# Patient Record
Sex: FEMALE | Race: WHITE
[De-identification: names, ages, dates, MRNs, and addresses within clinical notes are randomized per-mention and may not be internally consistent; named-entity substitution may affect disease eponyms.]

---

## 2019-01-22 ENCOUNTER — HOSPITAL ENCOUNTER (OUTPATIENT)
Dept: HOSPITAL 53 - M LAB REF | Age: 64
End: 2019-01-22
Attending: PHYSICIAN ASSISTANT
Payer: COMMERCIAL

## 2019-01-22 DIAGNOSIS — M54.5: Primary | ICD-10-CM

## 2019-03-20 ENCOUNTER — HOSPITAL ENCOUNTER (OUTPATIENT)
Dept: HOSPITAL 53 - M OPP | Age: 64
Discharge: HOME | End: 2019-03-20
Attending: INTERNAL MEDICINE
Payer: MEDICARE

## 2019-03-20 VITALS — HEIGHT: 60 IN | WEIGHT: 142 LBS | BODY MASS INDEX: 27.88 KG/M2

## 2019-03-20 VITALS — SYSTOLIC BLOOD PRESSURE: 151 MMHG | DIASTOLIC BLOOD PRESSURE: 91 MMHG

## 2019-03-20 DIAGNOSIS — I63.9: Primary | ICD-10-CM

## 2019-03-20 PROCEDURE — 93312 ECHO TRANSESOPHAGEAL: CPT

## 2019-03-20 PROCEDURE — 93320 DOPPLER ECHO COMPLETE: CPT

## 2019-03-20 PROCEDURE — 93325 DOPPLER ECHO COLOR FLOW MAPG: CPT

## 2019-03-20 NOTE — T-ECHO
DATE OF PROCEDURE:  03/20/2019

 

REFERRING PHYSICIAN:

 

PREPROCEDURE DIAGNOSIS:  Cryptogenic stroke.

 

POSTPROCEDURE DIAGNOSIS: Cryptogenic stroke, patent foramen ovale with

intraatrial septal aneurysm.

 

FINDINGS: Patent foramen ovale associated with intraatrial septal aneurysm.

 

PROCEDURE: Transesophageal echocardiogram with bubble study.

 

SURGEON: Rich Aguilar MD

 

ASSISTANT: None.

 

CONSCIOUS SEDATION: IV conscious sedation: Midazolam 2 mg IV.

 

COMPLICATIONS: None.

 

PROCEDURE DESCRIPTION:

Patient received Cetacaine spray to the back of the pharynx. She received a total

of 2 mg of Midazolam IV for light IV conscious sedation. Patient tolerated the

procedure well without any immediate complications. Rhythm was sinus. Esophageal

intubation was accomplished without difficulty using a Gavin's 3D

transesophageal echocardiogram probe.  A secundum type interatrial septal

aneurysm was present. This was associated with a small patent foramen ovale that

was demonstrated using a bubble injection via the right upper extremity with

Valsalva maneuver release showing a small amount of shunting from the right

atrium to the left atrium via the patent foramen ovale (PFO). The bubble study

was accomplished using 8 mL of injectable normal saline with 1 mL of the

patient's own blood withdrawn from the IV site combined with 1 mL of air which

was then agitated back and forth between to 10 mL syringes  by a

three-way stopcock.

 

The atrial appeared normal in size. No mass or thrombi were seen within the atria

or their appendages. Pulmonary vein flow in the left upper pulmonary vein was

normal. Aortic valve was three-cuspid and displayed mild focal thickening and

focal calcific deposits. Trace aortic regurgitation was present. The mitral

leaflets were structurally and functionally normal. Tricuspid leaflets and

pulmonic valve were structurally and functionally normal. Normal left and right

ventricle systolic function. The transgastric views were moderately technically

difficult. No regional wall motion abnormalities of the left or right ventricles.

No pericardial effusion. Distal aortic arch and descending thoracic aorta showed

mild atherosclerosis. Left ventricle ejection fraction was 65% by visual

estimate.

 

CONCLUSIONS:

1. Small patent foramen ovale with right atrium to left atrium shunting

demonstrated by bubble study with Valsalva maneuver release.

 

2. Secundum type interatrial septal aneurysm.

 

3. Very mild aortic valve sclerosis of a three-cuspid aortic valve. Trace aortic

regurgitation.

 

4. Mild atherosclerosis of the distal aortic arch and descending thoracic aorta.

 

5. Normal left and right ventricle size and systolic function. Normal left

ventricle regional wall motion. Left ventricular ejection fraction (LVEF) 65% by

visual estimate.

## 2019-03-22 ENCOUNTER — HOSPITAL ENCOUNTER (OUTPATIENT)
Dept: HOSPITAL 53 - M SDC | Age: 64
Discharge: HOME | End: 2019-03-22
Attending: INTERNAL MEDICINE
Payer: MEDICARE

## 2019-03-22 VITALS — SYSTOLIC BLOOD PRESSURE: 154 MMHG | DIASTOLIC BLOOD PRESSURE: 78 MMHG

## 2019-03-22 VITALS — BODY MASS INDEX: 28.03 KG/M2 | HEIGHT: 60 IN | WEIGHT: 142.8 LBS

## 2019-03-22 DIAGNOSIS — Z79.82: ICD-10-CM

## 2019-03-22 DIAGNOSIS — Z98.84: ICD-10-CM

## 2019-03-22 DIAGNOSIS — E66.3: ICD-10-CM

## 2019-03-22 DIAGNOSIS — E78.00: ICD-10-CM

## 2019-03-22 DIAGNOSIS — F32.9: ICD-10-CM

## 2019-03-22 DIAGNOSIS — Z79.899: ICD-10-CM

## 2019-03-22 DIAGNOSIS — G25.81: ICD-10-CM

## 2019-03-22 DIAGNOSIS — E83.42: ICD-10-CM

## 2019-03-22 DIAGNOSIS — I10: ICD-10-CM

## 2019-03-22 DIAGNOSIS — I63.9: Primary | ICD-10-CM

## 2019-03-22 DIAGNOSIS — E87.6: ICD-10-CM

## 2019-03-22 DIAGNOSIS — R94.31: ICD-10-CM

## 2019-03-22 DIAGNOSIS — D64.9: ICD-10-CM

## 2019-03-22 DIAGNOSIS — E55.9: ICD-10-CM

## 2019-03-22 PROCEDURE — 33285 INSJ SUBQ CAR RHYTHM MNTR: CPT

## 2019-03-22 NOTE — RO
DATE OF PROCEDURE:  03/22/2019

 

PREOPERATIVE DIAGNOSIS:  Cryptogenic stroke.

 

POSTOPERATIVE DIAGNOSIS:  Cryptogenic stroke.

 

PROCEDURE PERFORMED:  Implantation of [employer] implantable loop recorder.

 

SURGEON:  Rich Aguilar MD

 

ASSISTANT:  None.

 

ANESTHESIA:  Lidocaine 1% local/monitored anesthetic care.

 

FINDINGS:  Cryptogenic stroke.

 

No specimens.

 

Estimated blood loss less than 1 mL.

 

No blood products replaced.

 

No drains.

 

No complications.

 

PROCEDURE DESCRIPTION:  Patient was prepped and draped over the sternum and left

anterior chest.  Lidocaine 1% was used for local anesthetic.

 

An incision, approximately 1 cm in length, was made with a #15 blade through the

skin at approximately the left 4th interspace about 1 inch lateral to the left

parasternal border.  The guide on the insertion tool was then placed into the

incision and advanced in the subcutaneous tissue parallel to the chest wall in a

caudal direction.  The insertion tool was then rotated 180 degrees.  The plunger

was used then to drive the loop recorder into the subcutaneous tissue.  The

plunger was removed and then the insertion tool was removed leaving the loop

recorder behind.

 

The initial R wave amplitude measured 0.31 mV.  The skin was then approximated

using a subcuticular stitch consisting of #4-0 Biosyn with the ends of the

sutures protruding from the skin at both side of the incision.  Next, two layers

of Dermabond was applied.  The Biosyn suture was then removed entirely from the

incision line.  Next, Mastisol was placed to both sides of the incision

perpendicular to the incision being careful not to get the Mastisol on the

Dermabond.  After the Dermabond was dry, three half inch Steri-Strips cut in half

were placed perpendicular to the incision line.  Patient tolerated the procedure

well without any immediate complications.

 

The implantable loop recorder implanted was a [employer] Reveal LINQ, model LNQ11

with serial number [id number].

## 2019-03-26 ENCOUNTER — HOSPITAL ENCOUNTER (INPATIENT)
Dept: HOSPITAL 53 - M ED | Age: 64
LOS: 5 days | Discharge: HOME | DRG: 377 | End: 2019-03-31
Attending: INTERNAL MEDICINE | Admitting: HOSPITALIST
Payer: MEDICARE

## 2019-03-26 VITALS — SYSTOLIC BLOOD PRESSURE: 111 MMHG | DIASTOLIC BLOOD PRESSURE: 63 MMHG

## 2019-03-26 VITALS — BODY MASS INDEX: 29.3 KG/M2 | WEIGHT: 149.25 LBS | HEIGHT: 60 IN

## 2019-03-26 VITALS — DIASTOLIC BLOOD PRESSURE: 62 MMHG | SYSTOLIC BLOOD PRESSURE: 122 MMHG

## 2019-03-26 VITALS — DIASTOLIC BLOOD PRESSURE: 67 MMHG | SYSTOLIC BLOOD PRESSURE: 139 MMHG

## 2019-03-26 VITALS — DIASTOLIC BLOOD PRESSURE: 64 MMHG | SYSTOLIC BLOOD PRESSURE: 112 MMHG

## 2019-03-26 VITALS — SYSTOLIC BLOOD PRESSURE: 126 MMHG | DIASTOLIC BLOOD PRESSURE: 66 MMHG

## 2019-03-26 DIAGNOSIS — F41.9: ICD-10-CM

## 2019-03-26 DIAGNOSIS — F32.9: ICD-10-CM

## 2019-03-26 DIAGNOSIS — K44.9: ICD-10-CM

## 2019-03-26 DIAGNOSIS — Z79.899: ICD-10-CM

## 2019-03-26 DIAGNOSIS — Q21.1: ICD-10-CM

## 2019-03-26 DIAGNOSIS — Z98.84: ICD-10-CM

## 2019-03-26 DIAGNOSIS — R09.2: ICD-10-CM

## 2019-03-26 DIAGNOSIS — Z79.82: ICD-10-CM

## 2019-03-26 DIAGNOSIS — I69.322: ICD-10-CM

## 2019-03-26 DIAGNOSIS — D12.5: ICD-10-CM

## 2019-03-26 DIAGNOSIS — I69.354: ICD-10-CM

## 2019-03-26 DIAGNOSIS — E78.5: ICD-10-CM

## 2019-03-26 DIAGNOSIS — K28.4: ICD-10-CM

## 2019-03-26 DIAGNOSIS — Z88.2: ICD-10-CM

## 2019-03-26 DIAGNOSIS — I10: ICD-10-CM

## 2019-03-26 DIAGNOSIS — D62: ICD-10-CM

## 2019-03-26 DIAGNOSIS — K25.4: Primary | ICD-10-CM

## 2019-03-26 LAB
APTT BLD: 23.7 SECONDS (ref 25.4–37.6)
BASE EXCESS BLDA CALC-SCNC: -5.1 MMOL/L (ref -2–2)
BASOPHILS # BLD AUTO: 0 10^3/UL (ref 0–0.2)
BASOPHILS NFR BLD AUTO: 0.4 % (ref 0–1)
BUN SERPL-MCNC: 60 MG/DL (ref 7–18)
CALCIUM SERPL-MCNC: 9 MG/DL (ref 8.8–10.2)
CHLORIDE SERPL-SCNC: 110 MEQ/L (ref 98–107)
CK MB CFR.DF SERPL CALC: 2.68
CK MB SERPL-MCNC: 1 NG/ML (ref ?–3.6)
CK SERPL-CCNC: 41 U/L (ref 26–192)
CO2 BLDA CALC-SCNC: 19.5 MEQ/L (ref 23–31)
CO2 SERPL-SCNC: 22 MEQ/L (ref 21–32)
CREAT SERPL-MCNC: 0.85 MG/DL (ref 0.55–1.3)
EOSINOPHIL # BLD AUTO: 0 10^3/UL (ref 0–0.5)
EOSINOPHIL NFR BLD AUTO: 0.6 % (ref 0–3)
ETHANOL SERPL-MCNC: < 0.003 % (ref 0–0.01)
GFR SERPL CREATININE-BSD FRML MDRD: > 60 ML/MIN/{1.73_M2} (ref 45–?)
GLUCOSE SERPL-MCNC: 181 MG/DL (ref 70–100)
HCO3 BLDA-SCNC: 18.6 MEQ/L (ref 22–26)
HCO3 STD BLDA-SCNC: 20.2 MEQ/L (ref 22–26)
HCT VFR BLD AUTO: 21 % (ref 36–47)
HGB BLD-MCNC: 6.8 G/DL (ref 12–15.5)
INR PPP: 1.09
LYMPHOCYTES # BLD AUTO: 1.6 10^3/UL (ref 1.5–4.5)
LYMPHOCYTES NFR BLD AUTO: 22.8 % (ref 24–44)
MAGNESIUM SERPL-MCNC: 2 MG/DL (ref 1.8–2.4)
MCH RBC QN AUTO: 30 PG (ref 27–33)
MCHC RBC AUTO-ENTMCNC: 32.4 G/DL (ref 32–36.5)
MCV RBC AUTO: 92.5 FL (ref 80–96)
MONOCYTES # BLD AUTO: 0.2 10^3/UL (ref 0–0.8)
MONOCYTES NFR BLD AUTO: 3.3 % (ref 0–5)
NEUTROPHILS # BLD AUTO: 5 10^3/UL (ref 1.8–7.7)
NEUTROPHILS NFR BLD AUTO: 71.5 % (ref 36–66)
PCO2 BLDA: 28.6 MMHG (ref 35–45)
PH BLDA: 7.43 UNITS (ref 7.35–7.45)
PLATELET # BLD AUTO: 233 10^3/UL (ref 150–450)
PO2 BLDA: 378 MMHG (ref 75–100)
POTASSIUM SERPL-SCNC: 3.9 MEQ/L (ref 3.5–5.1)
PROTHROMBIN TIME: 14.2 SECONDS (ref 12.1–14.4)
RBC # BLD AUTO: 2.27 10^6/UL (ref 4–5.4)
SAO2 % BLDA: 99 % (ref 95–99)
SODIUM SERPL-SCNC: 142 MEQ/L (ref 136–145)
T4 FREE SERPL-MCNC: 1.02 NG/DL (ref 0.76–1.46)
TROPONIN I SERPL-MCNC: < 0.02 NG/ML (ref ?–0.1)
TSH SERPL DL<=0.005 MIU/L-ACNC: 2.29 UIU/ML (ref 0.36–3.74)
WBC # BLD AUTO: 7 10^3/UL (ref 4–10)

## 2019-03-26 PROCEDURE — 30233N1 TRANSFUSION OF NONAUTOLOGOUS RED BLOOD CELLS INTO PERIPHERAL VEIN, PERCUTANEOUS APPROACH: ICD-10-PCS | Performed by: INTERNAL MEDICINE

## 2019-03-26 RX ADMIN — SIMVASTATIN SCH MG: 40 TABLET, FILM COATED ORAL at 22:06

## 2019-03-26 RX ADMIN — DEXTROSE MONOHYDRATE SCH MG: 50 INJECTION, SOLUTION INTRAVENOUS at 17:50

## 2019-03-26 RX ADMIN — SODIUM CHLORIDE SCH MLS/HR: 9 INJECTION, SOLUTION INTRAVENOUS at 14:19

## 2019-03-26 NOTE — REP
CHEST, SINGLE VIEW:

 

There is no evidence of acute infiltrate.

 

No pleural effusion is seen.

 

The heart is normal in size.

 

The mediastinal silhouette is unremarkable.

 

The visualized osseous structures are intact.

 

IMPRESSION:

 

No acute pulmonary disease.

 

 

Electronically Signed by

Paulo Lopez MD 03/27/2019 10:12 A

## 2019-03-26 NOTE — REP
CT cervical spine without contrast

 

HISTORY: Syncope

 

COMPARISON: None

 

There is no acute fracture or subluxation. Disc bulges are present at the C3-4,

C4-5 and C6-7 levels.  A disc bulge with associated osteophyte formation is

present at the C5-6 level.  There is minimal narrowing of the spinal canal.

Uncinate process and/or facet hypertrophy are present at the the C3-4 through

C5-6 levels.  These findings produce minimal narrowing of the neural foramina.

The C5-6 intervertebral disc is decreased in height consistent with disc

degeneration.

 

IMPRESSION:

1.  There is no acute fracture or subluxation.

2.  There is cervical spondylosis at the C3-4 through C6-7 levels.

 

 

Electronically Signed by

Gordy Aguilar MD 03/26/2019 12:46 P

## 2019-03-26 NOTE — ECGEPIP
Stationary ECG Study

                           [facility] - ED

                                       

                                       Test Date:    2019

Pat Name:     YONIS LEMONS           Department:   

Patient ID:   [id number]                 Room:         [place] [address]

Gender:       F                        Technician:   ab

:          1955               Requested By: DANIEL CHAMBERS

Order Number: [id number]     Reading MD:   Rodolfo Del Cid

                                 Measurements

Intervals                              Axis          

Rate:         73                       P:            30

AL:           168                      QRS:          -14

QRSD:         106                      T:            94

QT:           418                                    

QTc:          463                                    

                           Interpretive Statements

SINUS RHYTHM

MODERATE T-WAVE ABNORMALITY, CONSIDER LATERAL ISCHEMIA

NO PRIORS FOR COMPARISON

 

Electronically Signed On 3- 19:56:55 EDT by Rodolfo Del Cid

## 2019-03-26 NOTE — REP
Clinical:  Rule out esophageal injury.

 

Technique:  Axial noncontrast images from the thoracic inlet to the upper abdomen

with coronal and sagittal re-formations.

 

Findings:

The bilateral lung fields are clear and without consolidation/contusion, effusion

or pneumothorax.  Tracheobronchial tree is patent.  Mediastinum demonstrates

atherosclerotic changes to the thoracic aorta and coronary arteries without

aortic aneurysm or cardiomegaly.  No pericardial effusion.  The esophagus is

relatively normal in appearance.  No paraesophageal fluid or extraluminal gas is

identified.  No evidence for pneumomediastinum.

 

Limited evaluation of the upper abdomen demonstrates prior gastric bypass

surgery.  Normal bilateral adrenal glands.

 

Surrounding musculoskeletal structures demonstrate age-related changes without

focal osseous abnormality.

 

Impression:

1.  Relatively normal noncontrast CT of the chest.  No acute mediastinal or

pleuroparenchymal process.  Specifically, no obvious esophageal abnormality or

pneumomediastinum.

2.  Atherosclerotic changes to the thoracic aorta and coronary arteries.

 

 

Electronically Signed by

Madan Mead MD 03/26/2019 07:18 P

## 2019-03-26 NOTE — REP
CT Head without contrast

 

HISTORY:  Syncope

 

COMPARISON: None

 

Areas of decreased attenuation are present in the basal ganglia.  These represent

old lacunar infarctions.  Areas of decreased attenuation are present in the

periventricular and subcortical white matter.  This represents small-vessel

ischemic disease.  There is no intraparenchymal hemorrhage, acute infarct,  mass

or midline shift.  The ventricular system and cortical sulci are dilated

consistent with minimal volume loss.  There is no extra cerebral collection.

There is no fracture.  The visualized sinuses are clear.

 

IMPRESSION:

1.  Old bilateral basal ganglia lacunar infarctions.

2.  Small vessel ischemic disease.

3.  Minimal volume loss.

 

 

 

 

Electronically Signed by

Gordy Aguilar MD 03/26/2019 12:42 P

## 2019-03-26 NOTE — HPE
DATE OF ADMISSION:  03/26/2018

 

64-year-old female with a past medical history of hypertension, hyperlipidemia,

history of cryptogenic infarct secondary to PFO with left hemiparesis,

dysarthria, presents to the emergency room after having a syncopal event while

visiting a friend who just had a baby at [facility].  The patient

apparently went apneic.  Cardiopulmonary resuscitation (CPR) was performed with

successful resuscitation and was brought to our emergency room for evaluation.

In the emergency room, she was awake, alert and oriented times three with just

rib pain, which likely came from the CPR.  She has not had any similar symptoms

in the past.  At this time, she denies any chest pain or shortness of breath.

She does note that she has had some blood in her mouth since she had her LISA

approximately 6 days ago.  Routine laboratories showed her hemoglobin was 6.8,

but her last hemoglobin documented here in 2009 was a hemoglobin of 10.  She has

no history of gastrointestinal bleed in the past.  The patient was started on IV

fluids and was typed and crossed for 2 units of packed red blood cell and 1 unit

is about to be hung.  She will be admitted for further management.

 

PAST MEDICAL HISTORY:

Again, past medical history of:

1.  Hypertension.

2.  Hyperlipidemia.

3.  Morbid obesity, status post bariatric surgery.

4.  History of cryptogenic stroke secondary to PFO with left hemiparesis and

dysarthria, which is now resolved.

5.  Depression.

 

ALLERGIES:  She has drug allergies to SULFA MEDICATIONS, SULFA CROSS REACTORS.

 

FAMILY HISTORY:   Negative for early coronary artery disease.

 

SOCIAL HISTORY:   The patient denies tobacco, alcohol, or illicit drugs.

 

MEDICATIONS:

She takes at home:

- amlodipine 10 mg by mouth daily

- vitamin C 250 mg by mouth daily

- aspirin 81 mg by mouth daily

- vitamin D3 1000 units by mouth daily

- cyanocobalamin 250 mcg by mouth daily

- Colace 100 mg by mouth daily

- Cymbalta 30 mg by mouth daily

- ferrous sulfate 325 mg by mouth daily

- simvastatin 40 mg by mouth at bedtime

- valsartan 320 mg by mouth daily

- Plavix 75 mg by mouth daily

- calcium citrate 950 mg by mouth daily

- magnesium lactate 84 mg by mouth daily

 

REVIEW OF SYSTEMS:  Negative for all ten major systems except what is mentioned

in the history of present illness.

 

PHYSICAL EXAMINATION:

VITAL SIGNS:  Blood pressure 114/66, heart rate is 74 and regular, respiratory

rate is 20, temperature is 95.8 temporal.  Oxygen saturation is 100% on room air.

 

Head is atraumatic, normocephalic.  Pale conjunctivae noted.  Neck is supple with

no jugular venous distention (JVD).

Lungs clear to auscultation.

S1, S2 audible.  No murmurs appreciated.

Abdomen is soft.  Positive bowel sounds.

No pedal edema.

Skin is intact.

Neurologic examination, the patient has no focality and is awake, alert and

oriented times three.

 

LABORATORIES:

WBC 7, hemoglobin 6.8, hematocrit 21, platelets are 233,000.

 

Sodium 142, potassium 3.9, chloride 110, CO2 of 22, BUN 65, creatinine 0.85.

 

Toxicology screen showed ethyl alcohol level less than 0.003.

 

PT 14.2, INR 1.09, PTT is 22.7.

 

12-lead electrocardiogram (EKG) showed no acute ST abnormality.

 

IMPRESSION:

1.  Syncope.

2.  Acute blood loss anemia.

 

PLAN:  The patient is to be admitted to the progressive care unit (PCU).  We will

get a second troponin to rule out acute coronary artery syndrome.  Dr. Aguilar

saw the patient downstairs already and we have ruled out cardiac reasons for the

syncope.  THe loop recorder that she has on showed no evidence of arrhythmia.

The likely scenario is that she has likely a slow bleed after the LISA, which has

caused her hemoglobin to drop and likely caused her to pass out.  We will have

Dr. Fernandez see the patient.  I will continue all preadmission medications, but

I will hold her Plavix.   We will continue her care in the progressive care unit

(PCU).  We will also follow her post transfusion complete blood count (CBC).

## 2019-03-27 VITALS — DIASTOLIC BLOOD PRESSURE: 84 MMHG | SYSTOLIC BLOOD PRESSURE: 136 MMHG

## 2019-03-27 VITALS — SYSTOLIC BLOOD PRESSURE: 120 MMHG | DIASTOLIC BLOOD PRESSURE: 65 MMHG

## 2019-03-27 VITALS — DIASTOLIC BLOOD PRESSURE: 63 MMHG | SYSTOLIC BLOOD PRESSURE: 113 MMHG

## 2019-03-27 VITALS — SYSTOLIC BLOOD PRESSURE: 132 MMHG | DIASTOLIC BLOOD PRESSURE: 64 MMHG

## 2019-03-27 VITALS — DIASTOLIC BLOOD PRESSURE: 81 MMHG | SYSTOLIC BLOOD PRESSURE: 172 MMHG

## 2019-03-27 VITALS — DIASTOLIC BLOOD PRESSURE: 77 MMHG | SYSTOLIC BLOOD PRESSURE: 139 MMHG

## 2019-03-27 LAB
BASOPHILS # BLD AUTO: 0 10^3/UL (ref 0–0.2)
BASOPHILS NFR BLD AUTO: 0.2 % (ref 0–1)
BUN SERPL-MCNC: 35 MG/DL (ref 7–18)
CALCIUM SERPL-MCNC: 8.1 MG/DL (ref 8.8–10.2)
CHLORIDE SERPL-SCNC: 111 MEQ/L (ref 98–107)
CO2 SERPL-SCNC: 25 MEQ/L (ref 21–32)
CREAT SERPL-MCNC: 0.84 MG/DL (ref 0.55–1.3)
EOSINOPHIL # BLD AUTO: 0.1 10^3/UL (ref 0–0.5)
EOSINOPHIL NFR BLD AUTO: 0.5 % (ref 0–3)
GFR SERPL CREATININE-BSD FRML MDRD: > 60 ML/MIN/{1.73_M2} (ref 45–?)
GLUCOSE SERPL-MCNC: 102 MG/DL (ref 70–100)
HCT VFR BLD AUTO: 26.6 % (ref 36–47)
HCT VFR BLD AUTO: 27.2 % (ref 36–47)
HGB BLD-MCNC: 8.7 G/DL (ref 12–15.5)
HGB BLD-MCNC: 9.2 G/DL (ref 12–15.5)
LYMPHOCYTES # BLD AUTO: 1.7 10^3/UL (ref 1.5–4.5)
LYMPHOCYTES NFR BLD AUTO: 17.6 % (ref 24–44)
MCH RBC QN AUTO: 30.4 PG (ref 27–33)
MCHC RBC AUTO-ENTMCNC: 33.8 G/DL (ref 32–36.5)
MCV RBC AUTO: 89.8 FL (ref 80–96)
MONOCYTES # BLD AUTO: 0.6 10^3/UL (ref 0–0.8)
MONOCYTES NFR BLD AUTO: 6.2 % (ref 0–5)
NEUTROPHILS # BLD AUTO: 7.3 10^3/UL (ref 1.8–7.7)
NEUTROPHILS NFR BLD AUTO: 75 % (ref 36–66)
PLATELET # BLD AUTO: 197 10^3/UL (ref 150–450)
POTASSIUM SERPL-SCNC: 3.7 MEQ/L (ref 3.5–5.1)
RBC # BLD AUTO: 3.03 10^6/UL (ref 4–5.4)
SODIUM SERPL-SCNC: 144 MEQ/L (ref 136–145)
WBC # BLD AUTO: 9.7 10^3/UL (ref 4–10)

## 2019-03-27 PROCEDURE — 0DJ08ZZ INSPECTION OF UPPER INTESTINAL TRACT, VIA NATURAL OR ARTIFICIAL OPENING ENDOSCOPIC: ICD-10-PCS | Performed by: SURGERY

## 2019-03-27 PROCEDURE — 0DBN8ZX EXCISION OF SIGMOID COLON, VIA NATURAL OR ARTIFICIAL OPENING ENDOSCOPIC, DIAGNOSTIC: ICD-10-PCS | Performed by: SURGERY

## 2019-03-27 RX ADMIN — FERROUS SULFATE TAB 325 MG (65 MG ELEMENTAL FE) SCH MG: 325 (65 FE) TAB at 10:11

## 2019-03-27 RX ADMIN — VALSARTAN SCH MG: 80 TABLET ORAL at 10:10

## 2019-03-27 RX ADMIN — SODIUM CHLORIDE SCH MLS/HR: 9 INJECTION, SOLUTION INTRAVENOUS at 03:05

## 2019-03-27 RX ADMIN — Medication SCH UNITS: at 10:10

## 2019-03-27 RX ADMIN — SODIUM CHLORIDE SCH MLS/HR: 9 INJECTION, SOLUTION INTRAVENOUS at 10:32

## 2019-03-27 RX ADMIN — DEXTROSE MONOHYDRATE SCH MG: 50 INJECTION, SOLUTION INTRAVENOUS at 06:06

## 2019-03-27 RX ADMIN — CYANOCOBALAMIN TAB 500 MCG SCH MCG: 500 TAB at 10:10

## 2019-03-27 RX ADMIN — DULOXETINE HYDROCHLORIDE SCH MG: 30 CAPSULE, DELAYED RELEASE ORAL at 10:08

## 2019-03-27 RX ADMIN — DOCUSATE SODIUM SCH MG: 100 CAPSULE, LIQUID FILLED ORAL at 09:00

## 2019-03-27 RX ADMIN — SIMVASTATIN SCH MG: 40 TABLET, FILM COATED ORAL at 20:00

## 2019-03-27 RX ADMIN — DEXTROSE MONOHYDRATE SCH MG: 50 INJECTION, SOLUTION INTRAVENOUS at 17:46

## 2019-03-27 RX ADMIN — ASPIRIN SCH MG: 81 TABLET ORAL at 16:29

## 2019-03-27 RX ADMIN — SUCRALFATE SCH GM: 1 TABLET ORAL at 20:00

## 2019-03-27 RX ADMIN — SODIUM CHLORIDE SCH MLS/HR: 9 INJECTION, SOLUTION INTRAVENOUS at 16:30

## 2019-03-27 RX ADMIN — Medication SCH MG: at 10:08

## 2019-03-27 NOTE — ROOR
________________________________________________________________________________

Patient Name: Tereza Owen           Procedure Date: 3/27/2019 2:50 PM

MRN: [id number]                          Account Number: [id number]

YOB: 1955               Age: 64

Room: [facility]                            Gender: Female

Note Status: Finalized                 

________________________________________________________________________________

 

Procedure:           Colonoscopy

Indications:         Acute post hemorrhagic anemia

Providers:           Messi Fernandez MD

Referring MD:        Bernarda YUSUF DO

Requesting Provider: 

Medicines:           Monitored Anesthesia Care

Complications:       No immediate complications.

________________________________________________________________________________

Procedure:           Pre-Anesthesia Assessment:

                     - Prior to the procedure, a History and Physical was 

                     performed, and patient medications and allergies were 

                     reviewed. The patient is competent. The risks and 

                     benefits of the procedure and the sedation options and 

                     risks were discussed with the patient. All questions were 

                     answered and informed consent was obtained. Patient 

                     identification and proposed procedure were verified by 

                     the physician, the nurse and the anesthetist in the 

                     endoscopy suite. Mental Status Examination: alert and 

                     oriented. Airway Examination: normal oropharyngeal airway 

                     and neck mobility. Respiratory Examination: clear to 

                     auscultation. CV Examination: normal. Prophylactic 

                     Antibiotics: The patient does not require prophylactic 

                     antibiotics. Prior Anticoagulants: The patient has taken 

                     Plavix (clopidogrel), last dose was 1 day prior to 

                     procedure. ASA Grade Assessment: III - A patient with 

                     severe systemic disease. After reviewing the risks and 

                     benefits, the patient was deemed in satisfactory 

                     condition to undergo the procedure. The anesthesia plan 

                     was to use monitored anesthesia care (MAC). Immediately 

                     prior to administration of medications, the patient was 

                     re-assessed for adequacy to receive sedatives. The heart 

                     rate, respiratory rate, oxygen saturations, blood 

                     pressure, adequacy of pulmonary ventilation, and response 

                     to care were monitored throughout the procedure. The 

                     physical status of the patient was re-assessed after the 

                     procedure.

                     The Colonoscope was introduced through the anus and 

                     advanced to the cecum, identified by appendiceal orifice 

                     and ileocecal valve. The colonoscopy was somewhat 

                     difficult due to black stool present throughout colon. 

                     Successful completion of the procedure was aided by 

                     lavage. The patient tolerated the procedure well. The 

                     quality of the bowel preparation was poor.

                                                                                

Findings:

     Hemorrhoids were found on perianal exam.

     There is no endoscopic evidence of bleeding, erythema or ulcerations in 

     the entire colon.

     Two pedunculated polyps were found in the sigmoid colon. The polyps were 

     4 to 7 mm in size. These polyps were removed with a cold snare. Resection 

     and retrieval were complete. For hemostasis, two hemostatic clips were 

     successfully placed (MR conditional). There was no bleeding at the end of 

     the procedure.

     No additional abnormalities were found on retroflexion.

                                                                                

Impression:          - Preparation of the colon was poor.

                     - Hemorrhoids found on perianal exam.

                     - Two 4 to 7 mm polyps in the sigmoid colon, removed with 

                     a cold snare. Resected and retrieved. Clips (MR 

                     conditional) were placed.

Recommendation:      - Admit the patient to [facility] for ongoing care.

                                                                                

 

Messi Fernandez MD

_______________________

Messi Fernandez MD

3/27/2019 3:40:32 PM

Electronically signed by Messi Fernandez MD

Number of Addenda: 0

 

Note Initiated On: 3/27/2019 2:50 PM

Estimated Blood Loss:

     Estimated blood loss was minimal.

## 2019-03-27 NOTE — IPNPDOC
Subjective


Date Seen


The patient was seen on 3/27/19.





Subjective


Chief Complaint/HPI


Patient seen and examined at the bedside. She reports that she is feeling much 

better this morning. However, notes that she has been having dark colored stools

since yesterday. Her hemoglobin count has remained stable. She is tentatively 

scheduled for an EGD and colonoscopy today with general surgery.





Objective


Physical Examination


General Exam:  Positive: Alert, Cooperative, No Acute Distress


ENT Exam:  Positive: Atraumatic, Mucous membr. moist/pink


Neck Exam:  Negative: JVD


Chest Exam:  Positive: Clear to auscultation, Normal air movement


Heart Exam:  Positive: Rate Normal, Normal S1, Normal S2


Abdomen Exam:  Positive: Soft


Extremity Exam:  Negative: Tenderness, Swelling


Psych Exam:  Positive: Oriented x 3





Assessment /Plan


Plan/VTE


VTE Prophylaxis Ordered?:  Yes





Plan


Symptomatic Anemia/Syncope 2/2 Upper GI Bleed 


In patient with Hx of Laparoscopic Gastric Bypass Surgery in 2008/2009 and 

recent LISA on 3/20/19


Hgb noted to be 6.8 during work up


s/p 2 Units of PRBC transfusion--stable H&H this AM


Surgery consulted-->Patient scheduled for EGD and Colonoscopy today


Patient NPO, IVF Hydration, IV Protonix ordered





Hx of CVA in 2015 and February 2019 with Residual left sided weakness


Cont ASA 81mg, Simvastatin


Plavix on hold 2/2 above





Hx of PFO, Interatrial Septal Aneurysm


Diagnosed on recent LISA 3/20/19


Follows with Dr. gAuilar as outpatient





Hypertension


Continue amlodipine, valsartan





Dyslipidemia


Continue statin





Anxiety/depression


Continue Cymbalta  





Hx of Laparoscopic Gastric Bypass Surgery in 2008/2009





DVT Prophylaxis


SCDs/TEDs





VS, I&O, 24H, [facility]


Vital Signs/I&O





Vital Signs








  Date Time  Temp Pulse Resp B/P (MAP) Pulse Ox O2 Delivery O2 Flow Rate FiO2


 


3/27/19 10:10    172/81    


 


3/27/19 08:00 98.3 72 20  99   


 


3/26/19 14:12      Room Air  














I&O- Last 24 Hours up to 6 AM 


 


 3/27/19





 06:00


 


Intake Total 1375 ml


 


Output Total 1000 ml


 


Balance 375 ml











Laboratory Data


24H LABS


Laboratory Tests 2


3/26/19 17:45: Troponin I 0.04#


3/27/19 04:50: 


Immature Granulocyte % (Auto) 0.5, White Blood Count 9.7, Red Blood Count 3.03L,

Hemoglobin 9.2L, Hematocrit 27.2L, Mean Corpuscular Volume 89.8, Mean 

Corpuscular Hemoglobin 30.4, Mean Corpuscular Hemoglobin Concent 33.8, Red Cell 

Distribution Width 15.7H, Platelet Count 197, Neutrophils (%) (Auto) 75.0H, 

Lymphocytes (%) (Auto) 17.6L, Monocytes (%) (Auto) 6.2H, Eosinophils (%) (Auto) 

0.5, Basophils (%) (Auto) 0.2, Neutrophils # (Auto) 7.3, Lymphocytes # (Auto) 

1.7, Monocytes # (Auto) 0.6, Eosinophils # (Auto) 0.1, Basophils # (Auto) 0.0, 

Nucleated Red Blood Cells % (auto) 0.0, Anion Gap 8, Glomerular Filtration Rate 

> 60.0, Blood Urea Nitrogen 35H, Creatinine 0.84, Sodium Level 144, Potassium 

Level 3.7, Chloride Level 111H, Carbon Dioxide Level 25, Calcium Level 8.1L


CBC/BMP


Laboratory Tests


3/27/19 01:49








3/27/19 04:50








Red Blood Count 3.03 L, Mean Corpuscular Volume 89.8, Mean Corpuscular Hemo

globin 30.4, Mean Corpuscular Hemoglobin Concent 33.8, Red Cell Distribution 

Width 15.7 H, Neutrophils (%) (Auto) 75.0 H, Lymphocytes (%) (Auto) 17.6 L, 

Monocytes (%) (Auto) 6.2 H, Eosinophils (%) (Auto) 0.5, Basophils (%) (Auto) 

0.2, Neutrophils # (Auto) 7.3, Lymphocytes # (Auto) 1.7, Monocytes # (Auto) 0.6,

Eosinophils # (Auto) 0.1, Basophils # (Auto) 0.0, Calcium Level 8.1 L











GUILLE JACKSON MD              Mar 27, 2019 13:59

## 2019-03-27 NOTE — CR.PDOC
General Surgery Consultation


Date of Consultation


3/27/19





History and Physical


CONSULT REPORT FOR: Dr. Olvera (hospitalist service)





REASON FOR CONSULTATION: Nausea and post hemorrhagic anemia





HISTORY OF PRESENT ILLNESS: 





Patient is 64-year-old female who had a cardiorespiratory arrest this morning 

when she was visiting in our hospital. She was visiting someone in the hospital 

when she fainted in the hallways and was noted to be in respiratory arrest. She 

underwent CPR and was revived. She was brought down to the emergency room and 

was found to be profoundly anemic with a hemoglobin of 6.8. On talking to her 

she reports intermittent passage of black stools likewise spitting up of red 

blood for the past week. Coincidentally she underwent transesophageal 

echocardiogram last week. She is denying any chest pains, abdominal discomfort, 

hematochezia. She several years removed from gastric bypass. She denies any 

problems related to her gastric bypass. She denies any prior upper or lower 

endoscopy. She tells me she had acholic or test done earlier this year which was

negative. She is on aspirin and Plavix for prior episodes of CVA.





PAST MEDICAL HISTORY:


1.hypertension


2. CVA


3. hyperlipidemia


4. depression


5. h/o morbid obesity s/p gastric bypass





PAST SURGICAL HISTORY: INCLUDES:


1. laparoscopic gastric bypass (2008/09)


2. LISA and placement of loop recorder last week





PREVIOUS ANESTHESIA REACTIONS: denies





ALLERGIES: Please see below.





FAMILY HISTORY: noncontributory





HOME MEDICATIONS: Please see below.





REVIEW OF SYSTEMS:


GENERAL: Patient denies feeling lightheaded though has been weak and from her 

daughter reports episodes of falls most recent about 2 weeks ago where she fell 

on her buttocks with some bruising on her left hip area. She has had at least 2 

CVAs. She still has some problems with speech but denies any problems with 

swallowing. After the LISA last week had some bleeding related to her tooth but 

denies any max hematemesis or hemoptysis.


HEENT: Denies blurred vision and double vision.  Denies ear symptoms. Denies 

hoarseness.


NECK:  Denies any neck pain


CARDIOVASCULAR: She denies any chest pains but reports some palpitation-like 

symptoms. Recent see EEA and placement of loop recorder


MUSCULOSKELETAL: Feels some generalized weakness, pain and left hip area from 

recent fall.


SKIN: Resolving hematoma in the left hip.


NEUROLOGIC: Patient with prior history of strokes.


PSYCHIATRIC: Reports prior diagnosis of depression.


ENDOCRINE: Denies thyroid disease.


HEMATOLOGY/ONCOLOGY: Denies bleeding or clotting disorder. Patient on aspirin 

and Plavix for her CVA.


PULMONARY: Denies chronic cough, dyspnea and wheezing.


GASTROINTESTINAL: Reports passage of black stools and cleared of its tarry. She 

is on iron but reports his stools are darker than what's normal for an somewhat 

semi-formed. No previous colonoscopies were upper endoscopies. She is status 

post gastric bypass about 10 years ago.


GENITOURINARY: Denies dysuria, frequency, hematuria and nocturia.


ENDOCRINE: Denies polydipsia, polyphagia, polyuria, heat or cold intolerance.


INFECTIOUS: Denies any recent upper respiratory tract infection, UTI, need for 

use of antibiotics.


NUTRITION: Reports fair appetite.





PHYSICAL EXAMINATION:


VITALS SIGNS: Please see below.


GENERAL APPEARANCE:Patient seen, laying in bed, awake, alert, and oriented. 

Comfortable, in no acute distress.


SKIN: Warm and moist.


HEENT: Normocephalic,  atraumatic. Pink palpebral conjunctiva, anicteric 

sclerae. Lips and mucosa appear moist.


NECK: Supple, no thyromegaly. No obvious jugular venous distention.


LUNGS: Clear to auscultation bilaterally. No wheezing appreciated.


HEART: No chest wall abnormalities. Regular rate and rhythm with no murmurs 

appreciated.


ABDOMEN: Abdomen is nondistended, soft, nontender on palpation


EXTREMITIES: Extremities have no deformities.  No edema identified





ANCILLARIES: .





LABORATORY DATA: Please see below.





IMAGING STUDIES:


CT head


1.  Old bilateral basal ganglia lacunar infarctions.


2.  Small vessel ischemic disease.


3.  Minimal volume loss.





CT Chest


1.  Relatively normal noncontrast CT of the chest.  No acute mediastinal or


pleuroparenchymal process.  Specifically, no obvious esophageal abnormality or


pneumomediastinum.


2.  Atherosclerotic changes to the thoracic aorta and coronary arteries.








IMPRESSION AND PLAN: 





Post hemorrhagic anemia


gastric bypass status





On my conversation with our hospitalist, ask him to perform a CT of the chest 

using a Storz some trauma related to her LISA last week as initially she was 

reporting spitting out some blood after the procedure but this turns out to be 

because she had some loose tooth after the procedure. CT of the chest was 

negative for any contusion to the esophagus. She had previous gastric bypass and

probably may be at risk for forming anastomotic ulcers. There are also some 

reports of patient's forming ulcers within the remnant stomach though this will 

be hard to discover via an endoscopy or any other study. She had a colon or test

according to her early this year which was negative and still had a colonoscopy.

Reports black stools with thus most likely source of bleeding is upper GI given 

that she has not had any previous colonoscopies I will prep her and at the same 

time perform a colonoscopy. Will schedule patient for UGI endoscopy and 

colonoscopy. Consent was obtained from the patient. Her daughter was at the 

bedside with her and they answered her questions and concerns at the time that I

saw the patient. Further recommendations after the procedure.





Vital Signs





Vital Signs








  Date Time  Temp Pulse Resp B/P (MAP) Pulse Ox O2 Delivery O2 Flow Rate FiO2


 


3/27/19 01:25 98.6 69 20 139/77 (97) 98   


 


3/26/19 14:12      Room Air  








I&Os











I&O- Last 24 Hours up to 6 AM 


 


 3/27/19





 05:59


 


Intake Total 0 ml


 


Output Total 900 ml


 


Balance -900 ml











Laboratory Data


Labs 24H


Laboratory Tests 2


3/26/19 12:03: 


Blood Gas Bicarbonate Standard 20.2L, Arterial Blood pH 7.432, Arterial Blood 

Partial Pressure CO2 28.6L, Arterial Blood Partial Pressure O2 378.0H, Arterial 

Blood Total CO2 19.5L, Arterial Blood HCO3 18.6L, Arterial Blood Base Excess -

5.1L, Arterial Blood Oxygen Saturation 99.0


3/26/19 12:25: 


Immature Granulocyte % (Auto) 1.4, White Blood Count 7.0, Red Blood Count 2.27L,

Hemoglobin 6.8*L, Hematocrit 21.0L, Mean Corpuscular Volume 92.5, Mean 

Corpuscular Hemoglobin 30.0, Mean Corpuscular Hemoglobin Concent 32.4, Red Cell 

Distribution Width 14.3, Platelet Count 233, Neutrophils (%) (Auto) 71.5H, 

Lymphocytes (%) (Auto) 22.8L, Monocytes (%) (Auto) 3.3, Eosinophils (%) (Auto) 

0.6, Basophils (%) (Auto) 0.4, Neutrophils # (Auto) 5.0, Lymphocytes # (Auto) 

1.6, Monocytes # (Auto) 0.2, Eosinophils # (Auto) 0.0, Basophils # (Auto) 0.0, 

Nucleated Red Blood Cells % (auto) 0.0, Prothrombin Time 14.2, Prothromb Time 

International Ratio 1.09, Activated Partial Thromboplast Time 23.7L, Anion Gap 

10, Glomerular Filtration Rate > 60.0, Blood Urea Nitrogen 60H, Creatinine 0.85,

Sodium Level 142, Potassium Level 3.9, Chloride Level 110H, Carbon Dioxide Level

22, Calcium Level 9.0, Total Creatine Kinase 41, Magnesium Level 2.0, Creatine 

Kinase MB 1.0, Creatine Kinase MB Relative Index 2.68, Troponin I < 0.02, 

Thyroid Stimulating Hormone (TSH) 2.290, Free Thyroxine 1.02, Ethyl Alcohol 

Level < 0.003


3/26/19 17:45: Troponin I 0.04#


CBC/BMP


Laboratory Tests


3/26/19 12:25








Red Blood Count 2.27 L, Mean Corpuscular Volume 92.5, Mean Corpuscular 

Hemoglobin 30.0, Mean Corpuscular Hemoglobin Concent 32.4, Red Cell Distribution

Width 14.3, Neutrophils (%) (Auto) 71.5 H, Lymphocytes (%) (Auto) 22.8 L, 

Monocytes (%) (Auto) 3.3, Eosinophils (%) (Auto) 0.6, Basophils (%) (Auto) 0.4, 

Neutrophils # (Auto) 5.0, Lymphocytes # (Auto) 1.6, Monocytes # (Auto) 0.2, 

Eosinophils # (Auto) 0.0, Basophils # (Auto) 0.0, Calcium Level 9.0, Total 

Creatine Kinase 41











Home Medications


Scheduled


Amlodipine Besylate (Amlodipine Besylate) 10 Mg Tab, 10 MG PO DAILY, (Reported)


Ascorbic Acid (Vitamin C) 250 Mg Tab, 250 MG PO DAILY, (Reported)


Aspirin (Aspir-81) 81 Mg Tab, 81 MG PO DAILY, (Reported)


Calcium Citrate (Calcitrate) 950 Mg Tab, 950 MG PO DAILY, (Reported)


Cholecalciferol (Vitamin D3) 1,000 Unit Cap, 1,000 UNIT PO DAILY, (Reported)


Clopidogrel Bisulfate (Clopidogrel) 75 Mg Tab, 75 MG PO QHS, (Reported)


Cyanocobalamin (Vitamin B 12) 250 Mcg Cristian, 1,000 MCG PO DAILY, (Reported)


Docusate Sodium (Stool Softener) 100 Mg Cap, 100 MG PO DAILY, (Reported)


Duloxetine Hcl (Cymbalta) 30 Mg Cap, 30 MG PO DAILY, (Reported)


Ferrous Sulfate (Ferrous Sulfate) 325 Mg Tab, 325 MG PO DAILY, (Reported)


Magnesium Lactate (Mag-Tab Sr) 84 Mg Tab, 84 MG PO DAILY, (Reported)


Simvastatin - High Dose (Simvastatin) 40 Mg Tab, 40 MG PO QHS, (Reported)


Valsartan (Valsartan) 320 Mg Tab, 320 MG PO DAILY, (Reported)





Allergies


Coded Allergies:  


     Sulfa (Sulfonamide Antibiotics) (Verified  Allergy, Unknown, 3/27/19)











VERONIQUE LAMA MD         Mar 27, 2019 02:08

## 2019-03-27 NOTE — ROOR
________________________________________________________________________________

Patient Name: Tereza Owen           Procedure Date: 3/27/2019 2:49 PM

MRN: [id number]                          Account Number: [id number]

YOB: 1955               Age: 64

Room: [facility]                            Gender: Female

Note Status: Finalized                 

________________________________________________________________________________

 

Procedure:           Upper GI endoscopy

Indications:         Acute post hemorrhagic anemia

Providers:           Messi Fernandez MD

Referring MD:        Bernarda YUSUF DO

Requesting Provider: 

Medicines:           Monitored Anesthesia Care

Complications:       No immediate complications.

________________________________________________________________________________

Procedure:           Pre-Anesthesia Assessment:

                     - Prior to the procedure, a History and Physical was 

                     performed, and patient medications and allergies were 

                     reviewed. The patient is competent. The risks and 

                     benefits of the procedure and the sedation options and 

                     risks were discussed with the patient. All questions were 

                     answered and informed consent was obtained. Patient 

                     identification and proposed procedure were verified by 

                     the physician, the nurse and the anesthetist in the 

                     endoscopy suite. Mental Status Examination: alert and 

                     oriented. Airway Examination: normal oropharyngeal airway 

                     and neck mobility. Respiratory Examination: clear to 

                     auscultation. CV Examination: normal. Prophylactic 

                     Antibiotics: The patient does not require prophylactic 

                     antibiotics. Prior Anticoagulants: The patient has taken 

                     Plavix (clopidogrel), last dose was 1 day prior to 

                     procedure. ASA Grade Assessment: III - A patient with 

                     severe systemic disease. After reviewing the risks and 

                     benefits, the patient was deemed in satisfactory 

                     condition to undergo the procedure. The anesthesia plan 

                     was to use monitored anesthesia care (MAC). Immediately 

                     prior to administration of medications, the patient was 

                     re-assessed for adequacy to receive sedatives. The heart 

                     rate, respiratory rate, oxygen saturations, blood 

                     pressure, adequacy of pulmonary ventilation, and response 

                     to care were monitored throughout the procedure. The 

                     physical status of the patient was re-assessed after the 

                     procedure.

                     The Endoscope was introduced through the mouth, and 

                     advanced to the efferent jejunal loop. The upper GI 

                     endoscopy was accomplished without difficulty. The 

                     patient tolerated the procedure well.

                                                                                

Findings:

     The examined esophagus was normal. Estimated blood loss: none.

     A small hiatal hernia was present.

     Two non-bleeding linear gastric ulcers with no stigmata of bleeding were 

     found at the anastomosis. The largest lesion was 1 mm in largest 

     dimension.

     Localized mildly erythematous mucosa with active bleeding and with no 

     stigmata of bleeding was found in the jejunum. with contact bleeding only

                                                                                

Impression:          - Normal esophagus.

                     - Small hiatal hernia.

                     - Non-bleeding gastric ulcers with no stigmata of 

                     bleeding.

                     - Erythematous (hyperemic) jejunal mucosa.

                     - No specimens collected.

Recommendation:      - Admit the patient to [facility] for ongoing care.

                     - Advance diet as tolerated.

                     - Use Protonix (pantoprazole) 40 mg PO daily for 6 weeks.

                     - Use sucralfate tablets 1 gram PO BID for 6 weeks.

                                                                                

 

Messi Fernandez MD

_______________________

Messi Fernandez MD

3/27/2019 3:36:59 PM

Electronically signed by Messi Fernandez MD

Number of Addenda: 0

 

Note Initiated On: 3/27/2019 2:49 PM

Estimated Blood Loss:

     Estimated blood loss: none.

## 2019-03-28 VITALS — SYSTOLIC BLOOD PRESSURE: 135 MMHG | DIASTOLIC BLOOD PRESSURE: 67 MMHG

## 2019-03-28 VITALS — DIASTOLIC BLOOD PRESSURE: 72 MMHG | SYSTOLIC BLOOD PRESSURE: 130 MMHG

## 2019-03-28 VITALS — SYSTOLIC BLOOD PRESSURE: 118 MMHG | DIASTOLIC BLOOD PRESSURE: 65 MMHG

## 2019-03-28 VITALS — DIASTOLIC BLOOD PRESSURE: 64 MMHG | SYSTOLIC BLOOD PRESSURE: 112 MMHG

## 2019-03-28 VITALS — DIASTOLIC BLOOD PRESSURE: 58 MMHG | SYSTOLIC BLOOD PRESSURE: 112 MMHG

## 2019-03-28 VITALS — DIASTOLIC BLOOD PRESSURE: 67 MMHG | SYSTOLIC BLOOD PRESSURE: 131 MMHG

## 2019-03-28 LAB
HCT VFR BLD AUTO: 25.9 % (ref 36–47)
HCT VFR BLD AUTO: 26.4 % (ref 36–47)
HGB BLD-MCNC: 8.6 G/DL (ref 12–15.5)
HGB BLD-MCNC: 8.7 G/DL (ref 12–15.5)
MCH RBC QN AUTO: 29.5 PG (ref 27–33)
MCHC RBC AUTO-ENTMCNC: 32.6 G/DL (ref 32–36.5)
MCV RBC AUTO: 90.4 FL (ref 80–96)
PLATELET # BLD AUTO: 179 10^3/UL (ref 150–450)
RBC # BLD AUTO: 2.92 10^6/UL (ref 4–5.4)
WBC # BLD AUTO: 7.5 10^3/UL (ref 4–10)

## 2019-03-28 RX ADMIN — SUCRALFATE SCH GM: 1 TABLET ORAL at 08:39

## 2019-03-28 RX ADMIN — Medication SCH MG: at 08:39

## 2019-03-28 RX ADMIN — SIMVASTATIN SCH MG: 40 TABLET, FILM COATED ORAL at 20:21

## 2019-03-28 RX ADMIN — Medication SCH UNITS: at 08:38

## 2019-03-28 RX ADMIN — VALSARTAN SCH MG: 80 TABLET ORAL at 08:52

## 2019-03-28 RX ADMIN — FERROUS SULFATE TAB 325 MG (65 MG ELEMENTAL FE) SCH MG: 325 (65 FE) TAB at 08:38

## 2019-03-28 RX ADMIN — SIMETHICONE PRN MG: 80 TABLET, CHEWABLE ORAL at 01:09

## 2019-03-28 RX ADMIN — DEXTROSE MONOHYDRATE SCH MG: 50 INJECTION, SOLUTION INTRAVENOUS at 17:58

## 2019-03-28 RX ADMIN — DEXTROSE MONOHYDRATE SCH MG: 50 INJECTION, SOLUTION INTRAVENOUS at 06:24

## 2019-03-28 RX ADMIN — SIMETHICONE PRN MG: 80 TABLET, CHEWABLE ORAL at 10:06

## 2019-03-28 RX ADMIN — DULOXETINE HYDROCHLORIDE SCH MG: 30 CAPSULE, DELAYED RELEASE ORAL at 08:39

## 2019-03-28 RX ADMIN — SODIUM CHLORIDE SCH MLS/HR: 9 INJECTION, SOLUTION INTRAVENOUS at 03:26

## 2019-03-28 RX ADMIN — DOCUSATE SODIUM SCH MG: 100 CAPSULE, LIQUID FILLED ORAL at 08:53

## 2019-03-28 RX ADMIN — CYANOCOBALAMIN TAB 500 MCG SCH MCG: 500 TAB at 08:38

## 2019-03-28 RX ADMIN — SUCRALFATE SCH GM: 1 TABLET ORAL at 20:21

## 2019-03-28 RX ADMIN — ASPIRIN SCH MG: 81 TABLET ORAL at 08:39

## 2019-03-29 VITALS — SYSTOLIC BLOOD PRESSURE: 107 MMHG | DIASTOLIC BLOOD PRESSURE: 56 MMHG

## 2019-03-29 VITALS — SYSTOLIC BLOOD PRESSURE: 152 MMHG | DIASTOLIC BLOOD PRESSURE: 82 MMHG

## 2019-03-29 VITALS — SYSTOLIC BLOOD PRESSURE: 146 MMHG | DIASTOLIC BLOOD PRESSURE: 79 MMHG

## 2019-03-29 VITALS — SYSTOLIC BLOOD PRESSURE: 145 MMHG | DIASTOLIC BLOOD PRESSURE: 88 MMHG

## 2019-03-29 VITALS — DIASTOLIC BLOOD PRESSURE: 61 MMHG | SYSTOLIC BLOOD PRESSURE: 133 MMHG

## 2019-03-29 VITALS — SYSTOLIC BLOOD PRESSURE: 110 MMHG | DIASTOLIC BLOOD PRESSURE: 70 MMHG

## 2019-03-29 LAB
BASOPHILS # BLD AUTO: 0 10^3/UL (ref 0–0.2)
BASOPHILS NFR BLD AUTO: 0.5 % (ref 0–1)
BUN SERPL-MCNC: 10 MG/DL (ref 7–18)
CALCIUM SERPL-MCNC: 7.6 MG/DL (ref 8.8–10.2)
CHLORIDE SERPL-SCNC: 112 MEQ/L (ref 98–107)
CO2 SERPL-SCNC: 25 MEQ/L (ref 21–32)
CREAT SERPL-MCNC: 0.67 MG/DL (ref 0.55–1.3)
EOSINOPHIL # BLD AUTO: 0.1 10^3/UL (ref 0–0.5)
EOSINOPHIL NFR BLD AUTO: 1.6 % (ref 0–3)
GFR SERPL CREATININE-BSD FRML MDRD: > 60 ML/MIN/{1.73_M2} (ref 45–?)
GLUCOSE SERPL-MCNC: 95 MG/DL (ref 70–100)
HCT VFR BLD AUTO: 22.4 % (ref 36–47)
HCT VFR BLD AUTO: 28.6 % (ref 36–47)
HCT VFR BLD AUTO: 29.3 % (ref 36–47)
HCT VFR BLD AUTO: 30.5 % (ref 36–47)
HGB BLD-MCNC: 10.3 G/DL (ref 12–15.5)
HGB BLD-MCNC: 7.5 G/DL (ref 12–15.5)
HGB BLD-MCNC: 9.4 G/DL (ref 12–15.5)
HGB BLD-MCNC: 9.8 G/DL (ref 12–15.5)
LYMPHOCYTES # BLD AUTO: 1.4 10^3/UL (ref 1.5–4.5)
LYMPHOCYTES NFR BLD AUTO: 23.3 % (ref 24–44)
MCH RBC QN AUTO: 29.7 PG (ref 27–33)
MCHC RBC AUTO-ENTMCNC: 32.9 G/DL (ref 32–36.5)
MCV RBC AUTO: 90.2 FL (ref 80–96)
MONOCYTES # BLD AUTO: 0.3 10^3/UL (ref 0–0.8)
MONOCYTES NFR BLD AUTO: 5.5 % (ref 0–5)
NEUTROPHILS # BLD AUTO: 4.2 10^3/UL (ref 1.8–7.7)
NEUTROPHILS NFR BLD AUTO: 68.8 % (ref 36–66)
PLATELET # BLD AUTO: 175 10^3/UL (ref 150–450)
POTASSIUM SERPL-SCNC: 3.6 MEQ/L (ref 3.5–5.1)
RBC # BLD AUTO: 3.17 10^6/UL (ref 4–5.4)
SODIUM SERPL-SCNC: 146 MEQ/L (ref 136–145)
WBC # BLD AUTO: 6.2 10^3/UL (ref 4–10)

## 2019-03-29 RX ADMIN — ASPIRIN SCH MG: 81 TABLET ORAL at 08:22

## 2019-03-29 RX ADMIN — Medication SCH MG: at 08:21

## 2019-03-29 RX ADMIN — CYANOCOBALAMIN TAB 500 MCG SCH MCG: 500 TAB at 08:22

## 2019-03-29 RX ADMIN — SUCRALFATE SCH GM: 1 TABLET ORAL at 08:21

## 2019-03-29 RX ADMIN — SUCRALFATE SCH GM: 1 TABLET ORAL at 21:11

## 2019-03-29 RX ADMIN — DOCUSATE SODIUM SCH MG: 100 CAPSULE, LIQUID FILLED ORAL at 08:21

## 2019-03-29 RX ADMIN — Medication SCH UNITS: at 08:21

## 2019-03-29 RX ADMIN — DEXTROSE MONOHYDRATE SCH MG: 50 INJECTION, SOLUTION INTRAVENOUS at 17:49

## 2019-03-29 RX ADMIN — VALSARTAN SCH MG: 80 TABLET ORAL at 08:10

## 2019-03-29 RX ADMIN — DULOXETINE HYDROCHLORIDE SCH MG: 30 CAPSULE, DELAYED RELEASE ORAL at 08:21

## 2019-03-29 RX ADMIN — FERROUS SULFATE TAB 325 MG (65 MG ELEMENTAL FE) SCH MG: 325 (65 FE) TAB at 08:21

## 2019-03-29 RX ADMIN — SIMVASTATIN SCH MG: 40 TABLET, FILM COATED ORAL at 21:11

## 2019-03-29 RX ADMIN — DEXTROSE MONOHYDRATE SCH MG: 50 INJECTION, SOLUTION INTRAVENOUS at 06:25

## 2019-03-29 NOTE — IPNPDOC
Subjective


Date Seen


The patient was seen on 3/28/19.





Subjective


Chief Complaint/HPI


Syncope, GIB


Events since last encounter


No further black tarry stools overnight, no dizziness or light headed ness. Had 

EGD and COLONOSCOPY yesterday.





Objective


Physical Examination


General Exam:  Positive: Alert, Cooperative, No Acute Distress


ENT Exam:  Positive: Atraumatic, Mucous membr. moist/pink


Neck Exam:  Negative: JVD


Chest Exam:  Positive: Clear to auscultation, Normal air movement


Heart Exam:  Positive: Rate Normal, Normal S1, Normal S2


Abdomen Exam:  Positive: Soft


Extremity Exam:  Negative: Tenderness, Swelling


Psych Exam:  Positive: Oriented x 3





Assessment /Plan


Assessment


Patient is 64-year-old female who had a cardiorespiratory arrest this morning 

when she was visiting in our hospital. She has past medical history of 

hypertension, hyperlipidemia, history of cryptogenic cerebral infarct secondary 

to PFO with left hemiparesis, dysarthria, presents to the emergency room after 

having a syncopal event .She was visiting someone in the hospital when she 

fainted in the hallway and was noted to be in respiratory arrest. She underwent 

CPR and was revived. She was brought down to the emergency room and was found to

be profoundly anemic with a hemoglobin of 6.8. On talking to her she reports 

intermittent passage of black stools likewise spitting up of red blood for the 

past week. She was admiited for Acute GIB and Acute blood loss anemia. 





Symptomatic Anemia/Syncope 2/2 Upper GI Bleed , Acute blood loss anemia. 


In patient with Hx of Laparoscopic Gastric Bypass Surgery in 2008/2009 and 

recent LISA on 3/20/19


Hgb noted to be 6.8 during work up


s/p 2 Units of PRBC transfusion


EGD showed non bleeding gastric ulcers and hyperemic jejunal mucosa


Most probably bleeding happened from the stomach and anastomotic site. 


conoloscopy showed poplps removed, internal hemorroids. 


Patient NPO, IVF Hydration, IV Protonix ordered





Hx of CVA in 2015 and February 2019 with Residual left sided weakness


Cont ASA 81mg, Simvastatin


Plavix on hold 2/2 above





Hx of PFO, Interatrial Septal Aneurysm


Diagnosed on recent LISA 3/20/19


Follows with Dr. Aguilar as outpatient





Hypertension


Continue amlodipine, valsartan





Dyslipidemia


Continue statin





Anxiety/depression


Continue Cymbalta  





Hx of Laparoscopic Gastric Bypass Surgery in 2008/2009





DVT Prophylaxis


SCDs/TEDs





Plan/VTE


VTE Prophylaxis Ordered?:  Yes





VS, I&O, 24H, Fishbone


Vital Signs/I&O





Vital Signs








  Date Time  Temp Pulse Resp B/P (MAP) Pulse Ox O2 Delivery O2 Flow Rate FiO2


 


3/28/19 20:00 98.9 61 18 130/72 (91) 100   


 


3/26/19 14:12      Room Air  














I&O- Last 24 Hours up to 6 AM 


 


 3/29/19





 06:00


 


Intake Total 1300 ml


 


Output Total 25 ml


 


Balance 1275 ml











Laboratory Data


24H LABS


Laboratory Tests 2


3/28/19 18:01: Nucleated Red Blood Cells % (auto) 0.0


CBC/BMP


Laboratory Tests


3/28/19 11:53








3/28/19 18:01








Red Blood Count 2.92 L, Mean Corpuscular Volume 90.4, Mean Corpuscular 

Hemoglobin 29.5, Mean Corpuscular Hemoglobin Concent 32.6, Red Cell Distribution

Width 15.8 H











ALEXANDR CATALAN MD                   Mar 29, 2019 00:31

## 2019-03-29 NOTE — IPNPDOC
Subjective


General


Date/Time Seen


The patient was seen on 3/29/19 at 09:57.





Subject


Chief Complaint/History


I visited our patient today. She was eating her breakfast. She reports feeling 

well. She denies any nausea, vomiting, abdominal discomfort. She has not had a 

bowel movement since her bowel prep 2 days ago. I noted that her hemoglobin last

night dropped and she received 1 unit of packed RBCs and repeat this morning 

shows this to go up as high as 9.2





Current Medications


Current Medications





Current Medications


Amlodipine Besylate (Norvasc) 10 mg DAILY PO  Last administered on 3/29/19at 

08:22;  Start 3/27/19 at 09:00


Ascorbic Acid (Vitamin C) 250 mg DAILY PO  Last administered on 3/29/19at 08:21;

 Start 3/27/19 at 09:00


Aspirin (Ecotrin) 81 mg DAILY PO  Last administered on 3/29/19at 08:22;  Start 

3/27/19 at 09:00


Cyanocobalamin (Vitamin B12) 1,000 mcg DAILY PO ;  Start 3/27/19 at 09:00;  Stop

3/27/19 at 09:28;  Status DC


Cyanocobalamin (Vitamin B12) 1,000 mcg DAILY PO  Last administered on 3/29/19at 

08:22;  Start 3/27/19 at 09:00


Docusate Sodium (Colace) 100 mg DAILY PO  Last administered on 3/29/19at 08:21; 

Start 3/27/19 at 09:00


Duloxetine HCl (Cymbalta) 30 mg DAILY PO  Last administered on 3/29/19at 08:21; 

Start 3/27/19 at 09:00


Ferrous Sulfate (Ferrous Sulfate) 325 mg DAILY PO  Last administered on 

3/29/19at 08:21;  Start 3/27/19 at 09:00


Home Med (Med Rec Complete!)  ASDIRECTED XX ;  Start 3/26/19 at 14:45;  Stop 

3/26/19 at 14:45;  Status DC


Morphine Sulfate (Morphine Sulfate Inj) 2 mg Q2HP  PRN IV SEVERE PAIN (PS 8-10) 

Last administered on 3/27/19at 06:50;  Start 3/26/19 at 14:30


Pantoprazole Sodium (Protonix) 40 mg Q12H IV  Last administered on 3/29/19at 

06:25;  Start 3/26/19 at 18:00


Simethicone (Mylicon) 80 mg TIDP  PRN PO GAS PAIN Last administered on 3/28/19at

10:06;  Start 3/28/19 at 01:00


Simvastatin (Zocor) 40 mg QHS PO  Last administered on 3/28/19at 20:21;  Start 

3/26/19 at 21:00


Sodium Chloride 1,000 ml @  75 mls/hr [id number] IV  Last administered on 3/28/19at

03:26;  Start 3/26/19 at 14:19;  Stop 3/28/19 at 10:03;  Status DC


Sucralfate (Carafate) 1 gm BID PO  Last administered on 3/29/19at 08:21;  Start 

3/27/19 at 21:00


Valsartan (Diovan) 320 mg DAILY PO  Last administered on 3/27/19at 10:10;  Start

3/27/19 at 09:00


Vitamin D (Vitamin D) 1,000 units DAILY PO  Last administered on 3/29/19at 

08:21;  Start 3/27/19 at 09:00





Allergies


Coded Allergies:  


     Sulfa (Sulfonamide Antibiotics) (Verified  Allergy, Unknown, 3/27/19)





Objective


Physical Examination


Examination


GENERAL APPEARANCE: Patient seen comfortable, she was sitting up eating her 

breakfast on a chair.


SKIN: Warm and dry.


HEENT: Normocephalic,  atraumatic. pale palpebral conjunctiva, anicteric 

sclerae. Lips and mucosa appear moist. Slight dysarthria with speech


NECK: Supple, no thyromegaly. No obvious jugular venous distention.


LUNGS: Clear to auscultation bilaterally. No wheezing appreciated.


HEART: No chest wall abnormalities. Regular rate and rhythm with no murmurs 

appreciated.


ABDOMEN: Abdomen is flat, soft, and nondistended. Nontender on palpation.


EXTREMITIES: Mild lower extremity edema.


Vital Signs





Vital Signs








  Date Time  Temp Pulse Resp B/P (MAP) Pulse Ox O2 Delivery O2 Flow Rate FiO2


 


3/29/19 08:22  64  152/82    


 


3/29/19 08:00 98.8  18  98   


 


3/26/19 14:12      Room Air  








I&Os











I&O- Last 24 Hours up to 6 AM 


 


 3/29/19





 06:00


 


Intake Total 1300 ml


 


Output Total 425 ml


 


Balance 875 ml











Laboratory Data


Labs 24H


Laboratory Tests 2


3/28/19 18:01: Nucleated Red Blood Cells % (auto) 0.0


3/29/19 03:10: 


Anion Gap 9, Glomerular Filtration Rate > 60.0, Blood Urea Nitrogen 10#, 

Creatinine 0.67, Sodium Level 146H, Potassium Level 3.6, Chloride Level 112H, Ca

rbon Dioxide Level 25, Calcium Level 7.6L


3/29/19 07:17: 


Nucleated Red Blood Cells % (auto) 0.0, Immature Granulocyte % (Auto) 0.3, White

Blood Count 6.2, Red Blood Count 3.17L, Hemoglobin 9.4L, Hematocrit 28.6L, Mean 

Corpuscular Volume 90.2, Mean Corpuscular Hemoglobin 29.7, Mean Corpuscular 

Hemoglobin Concent 32.9, Red Cell Distribution Width 15.0H, Platelet Count 175, 

Neutrophils (%) (Auto) 68.8H, Lymphocytes (%) (Auto) 23.3L, Monocytes (%) (Auto)

5.5H, Eosinophils (%) (Auto) 1.6, Basophils (%) (Auto) 0.5, Neutrophils # (Auto)

4.2, Lymphocytes # (Auto) 1.4L, Monocytes # (Auto) 0.3, Eosinophils # (Auto) 

0.1, Basophils # (Auto) 0.0


CBC/BMP


Laboratory Tests


3/28/19 11:53








3/28/19 18:01








Red Blood Count 2.92 L, Mean Corpuscular Volume 90.4, Mean Corpuscular 

Hemoglobin 29.5, Mean Corpuscular Hemoglobin Concent 32.6, Red Cell Distribution

Width 15.8 H





3/29/19 00:22








3/29/19 03:10








Calcium Level 7.6 L





3/29/19 07:17








Red Blood Count 3.17 L, Mean Corpuscular Volume 90.2, Mean Corpuscular 

Hemoglobin 29.7, Mean Corpuscular Hemoglobin Concent 32.9, Red Cell Distribution

Width 15.0 H, Neutrophils (%) (Auto) 68.8 H, Lymphocytes (%) (Auto) 23.3 L, 

Monocytes (%) (Auto) 5.5 H, Eosinophils (%) (Auto) 1.6, Basophils (%) (Auto) 

0.5, Neutrophils # (Auto) 4.2, Lymphocytes # (Auto) 1.4 L, Monocytes # (Auto) 

0.3, Eosinophils # (Auto) 0.1, Basophils # (Auto) 0.0





Impression


Posthemorrhagic anemia


Gastric bypass status


Anastomotic ulcer





I reviewed with her and her daughter the endoscopy in colonoscopy results. She 

did have some chronic appearing anastomotic ulcers but there was no stigmata of 

bleed. She has a little bit of friable tissue opposite that with slight contact 

bleed. She has a small amount of hematin processed particles on the gastric 

pouch. Given her profound anemia I would think this will be discharged bleed but

this was not definitive. I looked into the literature with regards to the 

incidence of duodenal ulcer formation and gastric bypass patients is not really 

known. Most common risks factors is a gastro-gastric fistula. Treatment still 

will be discontinued a PPI and suggest using Carafate for the anastomotic 

inflammation. I suggest continuing to observe the patient's course if there is a

drastic drop in the hemoglobin suggest performing a bleeding scan especially in 

light of that she is not having any passage  of bloody stools or melanotic 

stools to suggest active bleeding.





Plan / VTE


VTE Prophylaxis Ordered?:  No


VTE Exclusion Pharmacological:  Bleeding Risk











VERONIQUE LAMA MD         Mar 29, 2019 10:04

## 2019-03-29 NOTE — IPNPDOC
Subjective


Date Seen


The patient was seen on 3/29/19.





Subjective


Chief Complaint/HPI


No complaints this morning, has not had any bowel movements today. Denies any 

abdominal pain , nausea or vomiting, no light headedness or dizziness.





Objective


Physical Examination


General Exam:  Positive: Alert, Cooperative, No Acute Distress


ENT Exam:  Positive: Atraumatic, Mucous membr. moist/pink


Neck Exam:  Negative: JVD


Chest Exam:  Positive: Clear to auscultation, Normal air movement


Heart Exam:  Positive: Rate Normal, Normal S1, Normal S2


Abdomen Exam:  Positive: Soft


Extremity Exam:  Negative: Tenderness, Swelling


Psych Exam:  Positive: Oriented x 3





Assessment /Plan


Assessment


Patient is 64-year-old female who had a cardiorespiratory arrest this morning 

when she was visiting in our hospital. She has past medical history of 

hypertension, hyperlipidemia, history of cryptogenic cerebral infarct secondary 

to PFO with left hemiparesis, dysarthria, presents to the emergency room after 

having a syncopal event She was visiting someone in the hospital when she 

fainted in the hallway and was noted to be in respiratory arrest. She underwent 

CPR and was revived. She was brought down to the emergency room and was found to

be profoundly anemic with a hemoglobin of 6.8. On talking to her she reports 

intermittent passage of black stools likewise spitting up of red blood for the 

past week. She was admitted for Acute GIB and Acute blood loss anemia. 





Symptomatic Anemia/Syncope 2/2 Upper GI Bleed , Acute blood loss anemia. 


In patient with Hx of Laparoscopic Gastric Bypass Surgery in 2008/2009 and rece

nt LISA on 3/20/19


Hgb noted to be 6.8 during work up


s/p 3 Units of PRBC transfusion


EGD showed non bleeding gastric ulcers and hyperemic jejunal mucosa


Most probably bleeding happened from the stomach and anastomotic site. 


colonoscopy showed polyps. Removed, internal hemorrhoids. Pateint had again a 

drop in HH and received another unit


will continue to monitor HH if continues to drop will need bleeding scan to find

the source of active bleeding. 





Hx of CVA in 2015 and February 2019 with Residual left sided weakness


Cont ASA 81mg, Simvastatin


Plavix on hold 2/2 above





Hx of PFO, Interatrial Septal Aneurysm


Diagnosed on recent LISA 3/20/19


Follows with Dr. Aguilar as outpatient





Hypertension


Continue amlodipine, valsartan





Dyslipidemia


Continue statin





Anxiety/depression


Continue Cymbalta  





Hx of Laparoscopic Gastric Bypass Surgery in 2008/2009





DVT Prophylaxis


SCDs/TEDs





Plan/VTE


VTE Prophylaxis Ordered?:  No


VTE Exclusion Pharmacological:  Bleeding Risk





VS, I&O, 24H, Fishbone


Vital Signs/I&O





Vital Signs








  Date Time  Temp Pulse Resp B/P (MAP) Pulse Ox O2 Delivery O2 Flow Rate FiO2


 


3/29/19 16:00 99.2 60 16 107/56 (73) 96   


 


3/26/19 14:12      Room Air  














I&O- Last 24 Hours up to 6 AM 


 


 3/29/19





 06:00


 


Intake Total 1300 ml


 


Output Total 425 ml


 


Balance 875 ml











Laboratory Data


24H LABS


Laboratory Tests 2


3/29/19 03:10: 


Anion Gap 9, Glomerular Filtration Rate > 60.0, Blood Urea Nitrogen 10#, 

Creatinine 0.67, Sodium Level 146H, Potassium Level 3.6, Chloride Level 112H, 

Carbon Dioxide Level 25, Calcium Level 7.6L


3/29/19 07:17: 


Immature Granulocyte % (Auto) 0.3, White Blood Count 6.2, Red Blood Count 3.17L,

Hemoglobin 9.4L, Hematocrit 28.6L, Mean Corpuscular Volume 90.2, Mean 

Corpuscular Hemoglobin 29.7, Mean Corpuscular Hemoglobin Concent 32.9, Red Cell 

Distribution Width 15.0H, Platelet Count 175, Neutrophils (%) (Auto) 68.8H, 

Lymphocytes (%) (Auto) 23.3L, Monocytes (%) (Auto) 5.5H, Eosinophils (%) (Auto) 

1.6, Basophils (%) (Auto) 0.5, Neutrophils # (Auto) 4.2, Lymphocytes # (Auto) 

1.4L, Monocytes # (Auto) 0.3, Eosinophils # (Auto) 0.1, Basophils # (Auto) 0.0, 

Nucleated Red Blood Cells % (auto) 0.0


CBC/BMP


Laboratory Tests


3/29/19 00:22








3/29/19 03:10








Calcium Level 7.6 L





3/29/19 07:17








Red Blood Count 3.17 L, Mean Corpuscular Volume 90.2, Mean Corpuscular 

Hemoglobin 29.7, Mean Corpuscular Hemoglobin Concent 32.9, Red Cell Distribution

Width 15.0 H, Neutrophils (%) (Auto) 68.8 H, Lymphocytes (%) (Auto) 23.3 L, 

Monocytes (%) (Auto) 5.5 H, Eosinophils (%) (Auto) 1.6, Basophils (%) (Auto) 

0.5, Neutrophils # (Auto) 4.2, Lymphocytes # (Auto) 1.4 L, Monocytes # (Auto) 

0.3, Eosinophils # (Auto) 0.1, Basophils # (Auto) 0.0





3/29/19 11:43








3/29/19 17:40

















ALEXANDR CATALAN MD                   Mar 29, 2019 18:32

## 2019-03-30 VITALS — DIASTOLIC BLOOD PRESSURE: 70 MMHG | SYSTOLIC BLOOD PRESSURE: 126 MMHG

## 2019-03-30 VITALS — SYSTOLIC BLOOD PRESSURE: 126 MMHG | DIASTOLIC BLOOD PRESSURE: 66 MMHG

## 2019-03-30 VITALS — DIASTOLIC BLOOD PRESSURE: 71 MMHG | SYSTOLIC BLOOD PRESSURE: 134 MMHG

## 2019-03-30 VITALS — DIASTOLIC BLOOD PRESSURE: 69 MMHG | SYSTOLIC BLOOD PRESSURE: 124 MMHG

## 2019-03-30 VITALS — DIASTOLIC BLOOD PRESSURE: 80 MMHG | SYSTOLIC BLOOD PRESSURE: 129 MMHG

## 2019-03-30 LAB
BASOPHILS # BLD AUTO: 0 10^3/UL (ref 0–0.2)
BASOPHILS NFR BLD AUTO: 0.6 % (ref 0–1)
BUN SERPL-MCNC: 9 MG/DL (ref 7–18)
CALCIUM SERPL-MCNC: 8 MG/DL (ref 8.8–10.2)
CHLORIDE SERPL-SCNC: 111 MEQ/L (ref 98–107)
CO2 SERPL-SCNC: 27 MEQ/L (ref 21–32)
CREAT SERPL-MCNC: 0.71 MG/DL (ref 0.55–1.3)
EOSINOPHIL # BLD AUTO: 0.1 10^3/UL (ref 0–0.5)
EOSINOPHIL NFR BLD AUTO: 2.1 % (ref 0–3)
FERRITIN SERPL-MCNC: 74 NG/ML (ref 8–252)
GFR SERPL CREATININE-BSD FRML MDRD: > 60 ML/MIN/{1.73_M2} (ref 45–?)
GLUCOSE SERPL-MCNC: 81 MG/DL (ref 70–100)
HCT VFR BLD AUTO: 26.8 % (ref 36–47)
HCT VFR BLD AUTO: 27.4 % (ref 36–47)
HGB BLD-MCNC: 8.8 G/DL (ref 12–15.5)
HGB BLD-MCNC: 9.1 G/DL (ref 12–15.5)
IRON SATN MFR SERPL: 14.3 % (ref 13.2–45)
IRON SERPL-MCNC: 35 UG/DL (ref 50–170)
LYMPHOCYTES # BLD AUTO: 1.6 10^3/UL (ref 1.5–4.5)
LYMPHOCYTES NFR BLD AUTO: 30.5 % (ref 24–44)
MCH RBC QN AUTO: 29.4 PG (ref 27–33)
MCHC RBC AUTO-ENTMCNC: 32.8 G/DL (ref 32–36.5)
MCV RBC AUTO: 89.6 FL (ref 80–96)
MONOCYTES # BLD AUTO: 0.4 10^3/UL (ref 0–0.8)
MONOCYTES NFR BLD AUTO: 7 % (ref 0–5)
NEUTROPHILS # BLD AUTO: 3.1 10^3/UL (ref 1.8–7.7)
NEUTROPHILS NFR BLD AUTO: 59.4 % (ref 36–66)
PLATELET # BLD AUTO: 173 10^3/UL (ref 150–450)
POTASSIUM SERPL-SCNC: 3.1 MEQ/L (ref 3.5–5.1)
RBC # BLD AUTO: 2.99 10^6/UL (ref 4–5.4)
SODIUM SERPL-SCNC: 146 MEQ/L (ref 136–145)
TIBC SERPL-MCNC: 244 UG/DL (ref 250–450)
WBC # BLD AUTO: 5.1 10^3/UL (ref 4–10)

## 2019-03-30 RX ADMIN — Medication SCH UNITS: at 07:58

## 2019-03-30 RX ADMIN — DEXTROSE MONOHYDRATE SCH MG: 50 INJECTION, SOLUTION INTRAVENOUS at 17:46

## 2019-03-30 RX ADMIN — DOCUSATE SODIUM SCH MG: 100 CAPSULE, LIQUID FILLED ORAL at 07:57

## 2019-03-30 RX ADMIN — SUCRALFATE SCH GM: 1 TABLET ORAL at 07:57

## 2019-03-30 RX ADMIN — CYANOCOBALAMIN TAB 500 MCG SCH MCG: 500 TAB at 07:57

## 2019-03-30 RX ADMIN — FERROUS SULFATE TAB 325 MG (65 MG ELEMENTAL FE) SCH MG: 325 (65 FE) TAB at 07:55

## 2019-03-30 RX ADMIN — SUCRALFATE SCH GM: 1 TABLET ORAL at 21:00

## 2019-03-30 RX ADMIN — Medication SCH MG: at 07:57

## 2019-03-30 RX ADMIN — SIMVASTATIN SCH MG: 40 TABLET, FILM COATED ORAL at 21:01

## 2019-03-30 RX ADMIN — VALSARTAN SCH MG: 80 TABLET ORAL at 07:55

## 2019-03-30 RX ADMIN — ASPIRIN SCH MG: 81 TABLET ORAL at 07:56

## 2019-03-30 RX ADMIN — DEXTROSE MONOHYDRATE SCH MG: 50 INJECTION, SOLUTION INTRAVENOUS at 05:25

## 2019-03-30 RX ADMIN — DULOXETINE HYDROCHLORIDE SCH MG: 30 CAPSULE, DELAYED RELEASE ORAL at 07:56

## 2019-03-30 NOTE — IPNPDOC
Subjective


Date Seen


The patient was seen on 3/30/19.





Subjective


Chief Complaint/HPI


No complaints this am . No bowel movements today. HH dipped down again today 

will continue to monitor, no abdominal pain , or nausea or vomiting or diarrhea.





Objective


Physical Examination


General Exam:  Positive: Alert, Cooperative, No Acute Distress


ENT Exam:  Positive: Atraumatic, Mucous membr. moist/pink


Neck Exam:  Negative: JVD


Chest Exam:  Positive: Clear to auscultation, Normal air movement


Heart Exam:  Positive: Rate Normal, Normal S1, Normal S2


Abdomen Exam:  Positive: Soft


Extremity Exam:  Negative: Tenderness, Swelling


Psych Exam:  Positive: Oriented x 3





Assessment /Plan


Assessment


Patient is 64-year-old female who had a cardiorespiratory arrest this morning 

when she was visiting in our hospital. She has past medical history of 

hypertension, hyperlipidemia, history of cryptogenic cerebral infarct secondary 

to PFO with left hemiparesis, dysarthria, presents to the emergency room after 

having a syncopal event She was visiting someone in the hospital when she 

fainted in the hallway and was noted to be in respiratory arrest. She underwent 

CPR and was revived. She was brought down to the emergency room and was found to

be profoundly anemic with a hemoglobin of 6.8. On talking to her she reports 

intermittent passage of black stools likewise spitting up of red blood for the 

past week. She was admitted for Acute GIB and Acute blood loss anemia. 





Symptomatic Anemia/Syncope 2/2 Upper GI Bleed , Acute blood loss anemia. 


In patient with Hx of Laparoscopic Gastric Bypass Surgery in 2008/2009 and 

recent LISA on 3/20/19


Hgb noted to be 6.8 during work up


s/p 3 Units of PRBC transfusion


EGD showed non bleeding gastric ulcers and hyperemic jejunal mucosa


Most probably bleeding happened from the stomach and anastomotic site. 


colonoscopy showed polyps. Removed, internal hemorrhoids. Pateint had again a 

drop in HH and received another unit


will continue to monitor HH if continues to drop will need bleeding scan to find

the source of active bleeding. 





Hx of CVA in 2015 and February 2019 with Residual left sided weakness


Cont ASA 81mg, Simvastatin


Plavix on hold 2/2 above





Hx of PFO, Interatrial Septal Aneurysm


Diagnosed on recent LISA 3/20/19


Follows with Dr. Aguilar as outpatient





Hypertension


Continue amlodipine, valsartan





Dyslipidemia


Continue statin





Anxiety/depression


Continue Cymbalta  





Hx of Laparoscopic Gastric Bypass Surgery in 2008/2009





DVT Prophylaxis


SCDs/TEDs





Plan/VTE


VTE Prophylaxis Ordered?:  No


VTE Exclusion Pharmacological:  Bleeding Risk





VS, I&O, 24H, Fishbone


Vital Signs/I&O





Vital Signs








  Date Time  Temp Pulse Resp B/P (MAP) Pulse Ox O2 Delivery O2 Flow Rate FiO2


 


3/30/19 07:49 98.0 58 18 126/66 (86) 96   


 


3/26/19 14:12      Room Air  














I&O- Last 24 Hours up to 6 AM 


 


 3/30/19





 06:00


 


Intake Total 660 ml


 


Output Total 2120 ml


 


Balance -1460 ml











Laboratory Data


24H LABS


Laboratory Tests 2


3/30/19 04:56: 


Immature Granulocyte % (Auto) 0.4, White Blood Count 5.1, Red Blood Count 2.99L,

Hemoglobin 8.8L, Hematocrit 26.8L, Mean Corpuscular Volume 89.6, Mean 

Corpuscular Hemoglobin 29.4, Mean Corpuscular Hemoglobin Concent 32.8, Red Cell 

Distribution Width 14.9H, Platelet Count 173, Neutrophils (%) (Auto) 59.4, 

Lymphocytes (%) (Auto) 30.5, Monocytes (%) (Auto) 7.0H, Eosinophils (%) (Auto) 

2.1, Basophils (%) (Auto) 0.6, Neutrophils # (Auto) 3.1, Lymphocytes # (Auto) 

1.6, Monocytes # (Auto) 0.4, Eosinophils # (Auto) 0.1, Basophils # (Auto) 0.0, 

Nucleated Red Blood Cells % (auto) 0.0, Anion Gap 8, Glomerular Filtration Rate 

> 60.0, Blood Urea Nitrogen 9, Creatinine 0.71, Sodium Level 146H, Potassium 

Level 3.1L, Chloride Level 111H, Carbon Dioxide Level 27, Calcium Level 8.0L


3/30/19 08:02: 


Iron Level 35L, Total Iron Binding Capacity 244L, Transferrin % Saturation 14.3,

Ferritin 74


CBC/BMP


Laboratory Tests


3/29/19 17:40








3/29/19 23:46








3/30/19 04:56








Red Blood Count 2.99 L, Mean Corpuscular Volume 89.6, Mean Corpuscular 

Hemoglobin 29.4, Mean Corpuscular Hemoglobin Concent 32.8, Red Cell Distribution

Width 14.9 H, Neutrophils (%) (Auto) 59.4, Lymphocytes (%) (Auto) 30.5, 

Monocytes (%) (Auto) 7.0 H, Eosinophils (%) (Auto) 2.1, Basophils (%) (Auto) 

0.6, Neutrophils # (Auto) 3.1, Lymphocytes # (Auto) 1.6, Monocytes # (Auto) 0.4,

Eosinophils # (Auto) 0.1, Basophils # (Auto) 0.0, Calcium Level 8.0 L











ALEXANDR CATALAN MD                   Mar 30, 2019 13:47

## 2019-03-31 VITALS — SYSTOLIC BLOOD PRESSURE: 165 MMHG | DIASTOLIC BLOOD PRESSURE: 78 MMHG

## 2019-03-31 VITALS — DIASTOLIC BLOOD PRESSURE: 65 MMHG | SYSTOLIC BLOOD PRESSURE: 140 MMHG

## 2019-03-31 LAB
BASOPHILS # BLD AUTO: 0 10^3/UL (ref 0–0.2)
BASOPHILS NFR BLD AUTO: 0.4 % (ref 0–1)
BUN SERPL-MCNC: 8 MG/DL (ref 7–18)
CALCIUM SERPL-MCNC: 8.2 MG/DL (ref 8.8–10.2)
CHLORIDE SERPL-SCNC: 108 MEQ/L (ref 98–107)
CO2 SERPL-SCNC: 31 MEQ/L (ref 21–32)
CREAT SERPL-MCNC: 0.76 MG/DL (ref 0.55–1.3)
EOSINOPHIL # BLD AUTO: 0.1 10^3/UL (ref 0–0.5)
EOSINOPHIL NFR BLD AUTO: 2.7 % (ref 0–3)
GFR SERPL CREATININE-BSD FRML MDRD: > 60 ML/MIN/{1.73_M2} (ref 45–?)
GLUCOSE SERPL-MCNC: 80 MG/DL (ref 70–100)
HCT VFR BLD AUTO: 27.3 % (ref 36–47)
HGB BLD-MCNC: 9.1 G/DL (ref 12–15.5)
LYMPHOCYTES # BLD AUTO: 1.3 10^3/UL (ref 1.5–4.5)
LYMPHOCYTES NFR BLD AUTO: 24.1 % (ref 24–44)
MCH RBC QN AUTO: 30.4 PG (ref 27–33)
MCHC RBC AUTO-ENTMCNC: 33.3 G/DL (ref 32–36.5)
MCV RBC AUTO: 91.3 FL (ref 80–96)
MONOCYTES # BLD AUTO: 0.4 10^3/UL (ref 0–0.8)
MONOCYTES NFR BLD AUTO: 6.8 % (ref 0–5)
NEUTROPHILS # BLD AUTO: 3.5 10^3/UL (ref 1.8–7.7)
NEUTROPHILS NFR BLD AUTO: 65.6 % (ref 36–66)
PLATELET # BLD AUTO: 184 10^3/UL (ref 150–450)
POTASSIUM SERPL-SCNC: 3.5 MEQ/L (ref 3.5–5.1)
RBC # BLD AUTO: 2.99 10^6/UL (ref 4–5.4)
SODIUM SERPL-SCNC: 145 MEQ/L (ref 136–145)
WBC # BLD AUTO: 5.3 10^3/UL (ref 4–10)

## 2019-03-31 RX ADMIN — SUCRALFATE SCH GM: 1 TABLET ORAL at 09:21

## 2019-03-31 RX ADMIN — FERROUS SULFATE TAB 325 MG (65 MG ELEMENTAL FE) SCH MG: 325 (65 FE) TAB at 09:22

## 2019-03-31 RX ADMIN — ASPIRIN SCH MG: 81 TABLET ORAL at 09:22

## 2019-03-31 RX ADMIN — DEXTROSE MONOHYDRATE SCH MG: 50 INJECTION, SOLUTION INTRAVENOUS at 05:02

## 2019-03-31 RX ADMIN — Medication SCH MG: at 09:20

## 2019-03-31 RX ADMIN — VALSARTAN SCH MG: 80 TABLET ORAL at 09:20

## 2019-03-31 RX ADMIN — DULOXETINE HYDROCHLORIDE SCH MG: 30 CAPSULE, DELAYED RELEASE ORAL at 09:21

## 2019-03-31 RX ADMIN — DOCUSATE SODIUM SCH MG: 100 CAPSULE, LIQUID FILLED ORAL at 09:22

## 2019-03-31 RX ADMIN — CYANOCOBALAMIN TAB 500 MCG SCH MCG: 500 TAB at 09:22

## 2019-03-31 RX ADMIN — Medication SCH UNITS: at 09:21

## 2019-04-01 LAB — FOLATE SERPL-MCNC: 8 NG/ML (ref 5.4–?)

## 2019-04-02 LAB — VIT B12 SERPL-MCNC: > 2000 PG/ML (ref 247–911)

## 2019-04-09 NOTE — DS.PDOC
Discharge Summary


General


Date of Admission


Mar 26, 2019 at 14:19


Date of Discharge


03/31/19


Attending Physician:  ALEXANDR CATALAN MD





Discharge Summary


PROCEDURES PERFORMED DURING STAY: 


3/27/19


Colonoscopy : Hemorrhoids were found on perianal exam.


     There is no endoscopic evidence of bleeding, erythema or ulcerations in 


     the entire colon.


     Two pedunculated polyps were found in the sigmoid colon. The polyps were 


     4 to 7 mm in size. These polyps were removed with a cold snare. Resection 


     and retrieval were complete. For hemostasis, two hemostatic clips were 


     successfully placed (MR conditional). There was no bleeding at the end of 


     the procedure.


     No additional abnormalities were found on retroflexion.





Endoscopy: The examined esophagus was normal. Estimated blood loss: none.


     A small hiatal hernia was present.


     Two non-bleeding linear gastric ulcers with no stigmata of bleeding were 


     found at the anastomosis. The largest lesion was 1 mm in largest 


     dimension.


     Localized mildly erythematous mucosa with active bleeding and with no 


     stigmata of bleeding was found in the jejunum. with contact bleeding only


                                                                                





DISCHARGE DIAGNOSES:


Syncope


Acute Blood loss anemia


GIB probably from gastric ulcer and  anastomotic ulcer


Old CVA with residual left sided weakness. 


PFO and left interatrial septal aneurysm.


H/O gastric bypass surgery


Anxiety and depression


Hypertension


Dyslipidemia.





COMPLICATIONS/CHIEF COMPLAINT: Acute Blood Loss Anemia,Syncope And Collapse.





HISTORY OF PRESENT ILLNESS: See History and physical 





HOSPITAL COURSE: Patient is 64-year-old female who had a cardiorespiratory 

arrest this morning when she was visiting in our hospital. She has past medical 

history of hypertension, hyperlipidemia, history of cryptogenic cerebral infarct

secondary to PFO with left hemiparesis, dysarthria, presents to the emergency 

room after having a syncopal event She was visiting someone in the hospital when

she fainted in the hallway and was noted to be in respiratory arrest. She 

underwent CPR and was revived. She was brought down to the emergency room and 

was found to be profoundly anemic with a hemoglobin of 6.8. On talking to her 

she reports intermittent passage of black stools likewise spitting up of red 

blood for the past week. She was admitted for Acute GIB and Acute blood loss 

anemia. 





Symptomatic Anemia/Syncope 2/2 Upper GI Bleed , Acute blood loss anemia. 


In patient with Hx of Laparoscopic Gastric Bypass Surgery in 2008/2009 and 

recent LISA on 3/20/19


Hgb noted to be 6.8 during work up


s/p 3 Units of PRBC transfusion


EGD showed non bleeding gastric ulcers and hyperemic jejunal mucosa


Most probably bleeding happened from the stomach and anastomotic site. 


colonoscopy showed polyps. Removed, internal hemorrhoids.


Now HH is stable. 





Hx of CVA in 2015 and February 2019 with Residual left sided weakness


Cont ASA 81mg, Simvastatin


Plavix on hold 2/2 above





Hx of PFO, Interatrial Septal Aneurysm


Diagnosed on recent LISA 3/20/19


Follows with Dr. Aguilar as outpatient





Hypertension


Continue amlodipine, valsartan





Dyslipidemia


Continue statin





Anxiety/depression


Continue Cymbalta  





Hx of Laparoscopic Gastric Bypass Surgery in 2008/2009





DISCHARGE MEDICATIONS: Please see below.


 


ALLERGIES: Please see below.





PHYSICAL EXAMINATION ON DISCHARGE:


VITAL SIGNS: Please see below.


General Exam:  Positive: Alert, Cooperative, No Acute Distress


ENT Exam:  Positive: Atraumatic, Mucous membr. moist/pink


Neck Exam:  Negative: JVD


Chest Exam:  Positive: Clear to auscultation, Normal air movement


Heart Exam:  Positive: Rate Normal, Normal S1, Normal S2


Abdomen Exam:  Positive: Soft


Extremity Exam:  Negative: Tenderness, Swelling


Psych Exam:  Positive: Oriented x 3





LABORATORY DATA: Please see below.





ACTIVITY: [As tolerated].





DIET: As tolerated





DISPOSITION: 01 Home, Self-Care.





DISCHARGE INSTRUCTIONS:


PMD in 1 to 2 weeks


Dr Fernandez in 3 weeks





ITEMS TO FOLLOWUP ON ON OUTPATIENT:


Monitor HH if again starts dropping will need referral to GI for capsule 

endoscopy/ push enteroscopy





DISCHARGE CONDITION: [Stable].





TIME SPENT ON DISCHARGE: Greater than 30 minutes.





Vital Signs/I&Os





                                   Vital Signs








Label Value  Date Time


 


Patient Temperature 97.3 degrees F 3/31/19 0600


 


Temperature Source Temporal 3/31/19 0600


 


Pulse 64 3/31/19 0600


 


Respiratory Rate 16 bpm 3/31/19 0600


 


Blood Pressure Assessment 140/65 (90) 3/31/19 0600


 


Bedside Pulse Oximetry 97 % 3/31/19 0600











Laboratory Data


CBC/BMP











Item Value  Date Time


 


White Blood Count 5.3 10^3/uL 3/31/19 0603


 


Red Blood Count 2.99 10^6/uL L 3/31/19 0603


 


Hemoglobin 9.1 g/dl L 3/31/19 0603


 


Hematocrit 27.3 % L 3/31/19 0603


 


Mean Corpuscular Volume 91.3 fl 3/31/19 0603


 


Mean Corpuscular Hemoglobin 30.4 pg 3/31/19 0603


 


Mean Corpuscular Hemoglobin Concent 33.3 g/dl 3/31/19 0603


 


Red Cell Distribution Width 14.9 % H 3/31/19 0603


 


Platelet Count 184 10^3/uL 3/31/19 0603


 


Immature Granulocyte % (Auto) 0.4 % 3/31/19 0603


 


Neutrophils (%) (Auto) 65.6 % 3/31/19 0603


 


Lymphocytes (%) (Auto) 24.1 % 3/31/19 0603


 


Monocytes (%) (Auto) 6.8 % H 3/31/19 0603


 


Eosinophils (%) (Auto) 2.7 % 3/31/19 0603


 


Basophils (%) (Auto) 0.4 % 3/31/19 0603


 


Neutrophils # (Auto) 3.5 10^3/uL 3/31/19 0603


 


Lymphocytes # (Auto) 1.3 10^3/uL L 3/31/19 0603


 


Monocytes # (Auto) 0.4 10^3/uL 3/31/19 0603


 


Eosinophils # (Auto) 0.1 10^3/uL 3/31/19 0603


 


Basophils # (Auto) 0.0 10^3/uL 3/31/19 0603


 


Nucleated Red Blood Cells % (auto) 0.0 % 3/31/19 0603


 


Sodium Level 145 MEQ/L 3/31/19 0603


 


Potassium Level 3.5 MEQ/L 3/31/19 0603


 


Chloride Level 108 MEQ/L H 3/31/19 0603


 


Carbon Dioxide Level 31 MEQ/L 3/31/19 0603


 


Anion Gap 6 MEQ/L L 3/31/19 0603


 


Blood Urea Nitrogen 8 MG/DL 3/31/19 0603


 


Creatinine 0.76 MG/DL 3/31/19 0603


 


Glomerular Filtration Rate > 60.0 3/31/19 0603


 


Fasting Glucose 80 MG/DL 3/31/19 0603


 


Calcium Level 8.2 MG/DL L 3/31/19 0603


 


Vitamin B12 Level > 2000 PG/ML H 3/30/19 0802


 


Folate 8.0 NG/ML 3/30/19 0802


 


Iron Level 35 UG/DL L 3/30/19 0802


 


Total Iron Binding Capacity 244 UG/DL L 3/30/19 0802


 


Transferrin % Saturation 14.3 % 3/30/19 0802


 


Ferritin 74 NG/ML 3/30/19 0802











Discharge Medications


Scheduled


Amlodipine Besylate (Amlodipine Besylate) 10 Mg Tab, 10 MG PO DAILY, (Reported)


Ascorbic Acid (Vitamin C) 250 Mg Tab, 250 MG PO DAILY, (Reported)


Aspirin (Aspir 81) 81 Mg Tab, 81 MG PO DAILY, (Reported)


Calcium Citrate (Calcitrate) 950 Mg Tab, 950 MG PO DAILY, (Reported)


Cholecalciferol (Vitamin D3) 1,000 Unit Cap, 1,000 UNIT PO DAILY, (Reported)


Cyanocobalamin (Vitamin B-12) (Vitamin B-12) 250 Mcg Cristian, 1,000 MCG PO DAILY, 

(Reported)


Docusate Sodium (Stool Softener) 100 Mg Cap, 100 MG PO DAILY, (Reported)


Duloxetine Hcl (Cymbalta) 30 Mg Cap, 30 MG PO DAILY, (Reported)


Ferrous Sulfate (Ferrous Sulfate) 325 Mg Tab, 325 MG PO DAILY, (Reported)


Furosemide (Lasix) 20 Mg Tab, 1 TAB PO DAILY


   For 3 days only 


Magnesium l-Lactate (Mag-Tab Sr) 84 Mg Tab, 84 MG PO DAILY, (Reported)


Pantoprazole Sodium (Protonix) 40 Mg Tab, 40 MG PO BID


Simvastatin (Simvastatin) 40 Mg Tab, 40 MG PO QHS, (Reported)


Sucralfate (Sucralfate) 1 Gm Tab, 1 GM PO BID


Valsartan (Valsartan) 320 Mg Tab, 320 MG PO DAILY, (Reported)





Scheduled PRN


Simethicone (Simethicone) 80 Mg Chew, 80 MG PO TIDP PRN for GAS PAIN





Allergies


Coded Allergies:  


     Sulfa (Sulfonamide Antibiotics) (Verified  Allergy, Unknown, 3/27/19)











ALEXANDR CATALAN MD                    Apr 9, 2019 14:12

## 2020-08-27 ENCOUNTER — HOSPITAL ENCOUNTER (OUTPATIENT)
Dept: HOSPITAL 53 - M RAD | Age: 65
End: 2020-08-27
Attending: INTERNAL MEDICINE
Payer: MEDICARE

## 2020-08-27 DIAGNOSIS — Q21.1: ICD-10-CM

## 2020-08-27 DIAGNOSIS — R10.31: ICD-10-CM

## 2020-08-27 DIAGNOSIS — I63.40: Primary | ICD-10-CM

## 2020-08-27 DIAGNOSIS — M25.551: ICD-10-CM

## 2020-09-03 NOTE — REP
SONOGRAPHY RIGHT GROIN WITH DOPPLER 



HISTORY: Status post cardiac catheterization procedure right groin 05/20/2019. 
Right groin and thigh pain. Rule out pseudoaneurysm.  



FINDINGS: 

Two-dimensional  scanning is performed through the right groin with color 
Doppler and pulsed wave Doppler interrogation. There is no evidence of 
pseudoaneurysm, hematoma, or seroma. Normal triphasic Doppler waveforms are 
noted. Right common femoral artery peak systolic velocity is normal 96 cm/s. The
proximal SFA PSV is 107 cm/s and mid SFA PSV 76.0 cm/s. Waveforms are normal.   



IMPRESSION: 

Unremarkable soft tissue sonography. No evidence of pseudoaneurysm.   

MTDD

## 2021-02-25 ENCOUNTER — HOSPITAL ENCOUNTER (OUTPATIENT)
Dept: HOSPITAL 53 - M LAB REF | Age: 66
End: 2021-02-25
Attending: FAMILY MEDICINE
Payer: MEDICARE

## 2021-02-25 DIAGNOSIS — N39.0: Primary | ICD-10-CM

## 2022-04-22 ENCOUNTER — HOSPITAL ENCOUNTER (EMERGENCY)
Dept: HOSPITAL 53 - M ED | Age: 67
Discharge: HOME | End: 2022-04-22
Payer: MEDICARE

## 2022-04-22 VITALS — DIASTOLIC BLOOD PRESSURE: 70 MMHG | SYSTOLIC BLOOD PRESSURE: 153 MMHG

## 2022-04-22 VITALS — BODY MASS INDEX: 29.34 KG/M2 | WEIGHT: 155.43 LBS | HEIGHT: 61 IN

## 2022-04-22 DIAGNOSIS — R79.9: ICD-10-CM

## 2022-04-22 DIAGNOSIS — R22.43: ICD-10-CM

## 2022-04-22 DIAGNOSIS — D64.9: ICD-10-CM

## 2022-04-22 DIAGNOSIS — R53.83: Primary | ICD-10-CM

## 2022-04-22 DIAGNOSIS — Z88.2: ICD-10-CM

## 2022-04-22 DIAGNOSIS — I10: ICD-10-CM

## 2022-04-22 DIAGNOSIS — Z98.84: ICD-10-CM

## 2022-04-22 LAB
APTT BLD: 26.5 SECONDS (ref 25.9–37)
BASOPHILS # BLD AUTO: 0 10^3/UL (ref 0–0.2)
BASOPHILS NFR BLD AUTO: 0.6 % (ref 0–1)
BUN SERPL-MCNC: 18 MG/DL (ref 7–18)
CALCIUM SERPL-MCNC: 9.9 MG/DL (ref 8.8–10.2)
CHLORIDE SERPL-SCNC: 107 MEQ/L (ref 98–107)
CO2 SERPL-SCNC: 28 MEQ/L (ref 21–32)
CREAT SERPL-MCNC: 0.93 MG/DL (ref 0.55–1.3)
EOSINOPHIL # BLD AUTO: 0.1 10^3/UL (ref 0–0.5)
EOSINOPHIL NFR BLD AUTO: 1.6 % (ref 0–3)
GFR SERPL CREATININE-BSD FRML MDRD: > 60 ML/MIN/{1.73_M2} (ref 45–?)
GLUCOSE SERPL-MCNC: 93 MG/DL (ref 70–100)
HCT VFR BLD AUTO: 27.1 % (ref 36–47)
HGB BLD-MCNC: 8.5 G/DL (ref 12–15.5)
INR PPP: 0.93
LYMPHOCYTES # BLD AUTO: 1.4 10^3/UL (ref 1.5–5)
LYMPHOCYTES NFR BLD AUTO: 28.1 % (ref 24–44)
MAGNESIUM SERPL-MCNC: 2.2 MG/DL (ref 1.8–2.4)
MCH RBC QN AUTO: 30.5 PG (ref 27–33)
MCHC RBC AUTO-ENTMCNC: 31.4 G/DL (ref 32–36.5)
MCV RBC AUTO: 97.1 FL (ref 80–96)
MONOCYTES # BLD AUTO: 0.3 10^3/UL (ref 0–0.8)
MONOCYTES NFR BLD AUTO: 5.5 % (ref 2–8)
NEUTROPHILS # BLD AUTO: 3.3 10^3/UL (ref 1.5–8.5)
NEUTROPHILS NFR BLD AUTO: 64 % (ref 36–66)
PLATELET # BLD AUTO: 302 10^3/UL (ref 150–450)
POTASSIUM SERPL-SCNC: 3.9 MEQ/L (ref 3.5–5.1)
PROTHROMBIN TIME: 12.9 SECONDS (ref 12.7–14.5)
RBC # BLD AUTO: 2.79 10^6/UL (ref 4–5.4)
SODIUM SERPL-SCNC: 143 MEQ/L (ref 136–145)
T4 FREE SERPL-MCNC: 0.97 NG/DL (ref 0.76–1.46)
TSH SERPL DL<=0.005 MIU/L-ACNC: 2.69 UIU/ML (ref 0.36–3.74)
WBC # BLD AUTO: 5.1 10^3/UL (ref 4–10)

## 2022-05-24 ENCOUNTER — HOSPITAL ENCOUNTER (OUTPATIENT)
Dept: HOSPITAL 53 - M RAD | Age: 67
End: 2022-05-24
Attending: NURSE PRACTITIONER
Payer: MEDICARE

## 2022-05-24 DIAGNOSIS — R10.32: Primary | ICD-10-CM

## 2022-05-24 LAB
APPEARANCE UR: CLEAR
BACTERIA UR QL AUTO: NEGATIVE
BILIRUB UR QL STRIP.AUTO: NEGATIVE
GLUCOSE UR QL STRIP.AUTO: NEGATIVE MG/DL
HGB UR QL STRIP.AUTO: NEGATIVE
KETONES UR QL STRIP.AUTO: NEGATIVE MG/DL
LEUKOCYTE ESTERASE UR QL STRIP.AUTO: NEGATIVE
NITRITE UR QL STRIP.AUTO: NEGATIVE
PH UR STRIP.AUTO: 7 UNITS (ref 5–9)
PROT UR QL STRIP.AUTO: NEGATIVE MG/DL
RBC # UR AUTO: 3 /HPF (ref 0–3)
SP GR UR STRIP.AUTO: 1.01 (ref 1–1.03)
SQUAMOUS #/AREA URNS AUTO: 0 /HPF (ref 0–6)
UROBILINOGEN UR QL STRIP.AUTO: 0.2 MG/DL (ref 0–2)
WBC #/AREA URNS AUTO: 0 /HPF (ref 0–3)

## 2022-08-18 ENCOUNTER — HOSPITAL ENCOUNTER (OUTPATIENT)
Dept: HOSPITAL 53 - M RAD | Age: 67
End: 2022-08-18
Attending: STUDENT IN AN ORGANIZED HEALTH CARE EDUCATION/TRAINING PROGRAM
Payer: MEDICARE

## 2022-08-18 DIAGNOSIS — I25.10: Primary | ICD-10-CM

## 2022-10-25 ENCOUNTER — HOSPITAL ENCOUNTER (OUTPATIENT)
Dept: HOSPITAL 53 - M LAB REF | Age: 67
End: 2022-10-25
Attending: NURSE PRACTITIONER
Payer: MEDICARE

## 2022-10-25 DIAGNOSIS — R30.0: Primary | ICD-10-CM

## 2022-10-25 LAB
APPEARANCE UR: CLEAR
BACTERIA URNS QL MICRO: (no result)
BILIRUB UR QL STRIP: NEGATIVE
COLOR UR: YELLOW
GLUCOSE UR STRIP-MCNC: NEGATIVE MG/DL
HGB UR QL STRIP: (no result)
KETONES UR QL STRIP: NEGATIVE MG/DL
LEUKOCYTE ESTERASE UR QL STRIP: (no result)
NITRITE UR QL STRIP: POSITIVE
PH UR STRIP: 7 UNITS (ref 5–7)
PROT UR STRIP-MCNC: NEGATIVE MG/DL
RBC #/AREA URNS HPF: (no result) /HPF (ref 0–3)
SP GR UR STRIP: 1.01 (ref 1–1.03)
SQUAMOUS URNS QL MICRO: (no result) /HPF
UROBILINOGEN UR QL STRIP: NORMAL MG/DL
WBC #/AREA URNS HPF: (no result) /HPF (ref 0–3)

## 2023-01-20 ENCOUNTER — HOSPITAL ENCOUNTER (OUTPATIENT)
Dept: HOSPITAL 53 - M RAD | Age: 68
End: 2023-01-20
Attending: PHYSICIAN ASSISTANT
Payer: MEDICARE

## 2023-01-20 DIAGNOSIS — I65.23: Primary | ICD-10-CM

## 2023-01-20 PROCEDURE — 70496 CT ANGIOGRAPHY HEAD: CPT

## 2023-01-20 PROCEDURE — 70498 CT ANGIOGRAPHY NECK: CPT

## 2023-02-16 ENCOUNTER — HOSPITAL ENCOUNTER (OUTPATIENT)
Dept: HOSPITAL 53 - M RAD | Age: 68
End: 2023-02-16
Attending: PHYSICIAN ASSISTANT
Payer: MEDICARE

## 2023-02-16 DIAGNOSIS — I65.23: Primary | ICD-10-CM

## 2023-02-16 PROCEDURE — 70549 MR ANGIOGRAPH NECK W/O&W/DYE: CPT

## 2023-03-17 ENCOUNTER — HOSPITAL ENCOUNTER (OUTPATIENT)
Dept: HOSPITAL 53 - M LABSMTC | Age: 68
End: 2023-03-17
Attending: PHYSICIAN ASSISTANT
Payer: MEDICARE

## 2023-03-17 DIAGNOSIS — Z01.818: Primary | ICD-10-CM

## 2023-03-17 DIAGNOSIS — Z11.52: ICD-10-CM

## 2023-07-14 ENCOUNTER — HOSPITAL ENCOUNTER (OUTPATIENT)
Dept: HOSPITAL 53 - M CARPUL | Age: 68
End: 2023-07-14
Attending: FAMILY MEDICINE
Payer: MEDICARE

## 2023-07-14 DIAGNOSIS — Z86.73: ICD-10-CM

## 2023-07-14 DIAGNOSIS — I35.0: Primary | ICD-10-CM
